# Patient Record
Sex: FEMALE | Race: WHITE | Employment: UNEMPLOYED | URBAN - METROPOLITAN AREA
[De-identification: names, ages, dates, MRNs, and addresses within clinical notes are randomized per-mention and may not be internally consistent; named-entity substitution may affect disease eponyms.]

---

## 2023-02-17 ENCOUNTER — ANESTHESIA EVENT (OUTPATIENT)
Dept: OPERATING ROOM | Age: 33
End: 2023-02-17
Payer: COMMERCIAL

## 2023-02-17 RX ORDER — CHOLECALCIFEROL (VITAMIN D3) 125 MCG
5000 CAPSULE ORAL DAILY
COMMUNITY

## 2023-02-17 RX ORDER — ASPIRIN 81 MG/1
81 TABLET ORAL DAILY
COMMUNITY

## 2023-02-17 RX ORDER — CYANOCOBALAMIN (VITAMIN B-12) 500 MCG
TABLET ORAL DAILY
COMMUNITY

## 2023-02-17 NOTE — PROGRESS NOTES
April Rainey   Date and time of surgery :0915  Arrival Time:0715   1. Do not eat or drink anything after 12 midnight (or  hours) prior to surgery. This includes no water, chewing gum or mints.   2. Take the following pills will a small sip of water on the morning of surgery none.   3. Aspirin, Ibuprofen, Advil, Naproxen, Vitamin E and other Anti-inflammatory products should be stopped for 5 days before surgery or as directed by your physician.   4. Check with your Doctor regarding stopping Plavix, Coumadin, Lovenox, Fragmin or other blood thinners   5. Do not smoke, and do not drink any alcoholic beverages 24 hours prior to surgery.  This includes NA Beer.   6. You may brush your teeth and gargle the morning of surgery.  DO NOT SWALLOW WATER.   7. You MUST make arrangements for a responsible adult to take you home after your surgery. You will not be allowed to leave alone or drive yourself home.  It is strongly suggested someone stay with you the first 24 hrs. Your surgery will be cancelled if you do not have a ride home.   8. A parent/legal guardian must accompany a child scheduled for surgery and plan to stay at the hospital until the child is discharged.  Please do not bring other children with you.   9. Please wear simple, loose fitting clothing to the hospital.  Do not bring valuables (money, credit cards, checkbooks, etc.) Do not wear any makeup (including no eye makeup) or nail polish on your fingers or toes.   10. DO NOT wear any jewelry or piercings on day of surgery.  All body piercing jewelry must be removed.   11. If you have dentures, they will be removed before going to the OR; we will provide you a container.  If you wear contact lenses or glasses, they will be removed; please bring a case for them.   12. Please see your family doctor/pediatrician for a history & physical and/or concerning medications.  Bring any test results/reports from your physician's office.   13. Remember to bring Blood Bank  bracelet to the hospital on the day of surgery. 14. If you have a Living Will and Durable Power of  for Healthcare, please bring in a copy. 13. Notify your Surgeon if you develop any illness between now and surgery  time, cough, cold, fever, sore throat, nausea, vomiting, etc.  Please notify your surgeon if you experience dizziness, shortness of breath or blurred vision between now & the time of your surgery   16. DO NOT shave your operative site 96 hours prior to surgery. For face & neck surgery, men may use an electric razor 48 hours prior to surgery. 17. Shower the night before surgery with Antibacterial soap Hibiclens   18. To provide excellent care visitors will be limited to one in the room at any given time. 19.  Please bring picture ID and insurance card. 20.  Visit our web site for additional information:  [email or web address]. *Please call pre admission testing if you any further questions   Den Alex         [address] [place] [id number]    [id number]  [facility]  [phone]   [address] [place] [address]

## 2023-02-22 ENCOUNTER — ANESTHESIA (OUTPATIENT)
Dept: OPERATING ROOM | Age: 33
End: 2023-02-22
Payer: COMMERCIAL

## 2023-02-22 ENCOUNTER — HOSPITAL ENCOUNTER (OUTPATIENT)
Age: 33
Setting detail: OUTPATIENT SURGERY
Discharge: HOME | End: 2023-02-22
Attending: OBSTETRICS & GYNECOLOGY | Admitting: OBSTETRICS & GYNECOLOGY
Payer: COMMERCIAL

## 2023-02-22 VITALS
RESPIRATION RATE: 16 BRPM | TEMPERATURE: 98.3 F | OXYGEN SATURATION: 98 % | WEIGHT: 135.25 LBS | HEIGHT: 69 IN | SYSTOLIC BLOOD PRESSURE: 126 MMHG | BODY MASS INDEX: 20.03 KG/M2 | DIASTOLIC BLOOD PRESSURE: 83 MMHG | HEART RATE: 79 BPM

## 2023-02-22 DIAGNOSIS — O03.4 RETAINED PRODUCTS OF CONCEPTION FOLLOWING ABORTION: ICD-10-CM

## 2023-02-22 LAB
ABO/RH: NORMAL
ANTIBODY SCREEN: NORMAL
HCT VFR BLD CALC: 39.5 % (ref 36–48)
HEMOGLOBIN: 13.2 G/DL (ref 12–16)
MCH RBC QN AUTO: 30.9 PG (ref 26–34)
MCHC RBC AUTO-ENTMCNC: 33.3 G/DL (ref 31–36)
MCV RBC AUTO: 92.9 FL (ref 80–100)
PDW BLD-RTO: 12.9 % (ref 12.4–15.4)
PLATELET # BLD: 239 K/UL (ref 135–450)
PMV BLD AUTO: 8.2 FL (ref 5–10.5)
PREGNANCY, URINE: NEGATIVE
RBC # BLD: 4.25 M/UL (ref 4–5.2)
WBC # BLD: 5.5 K/UL (ref 4–11)

## 2023-02-22 PROCEDURE — 86850 RBC ANTIBODY SCREEN: CPT

## 2023-02-22 PROCEDURE — 7100000000 HC PACU RECOVERY - FIRST 15 MIN: Performed by: OBSTETRICS & GYNECOLOGY

## 2023-02-22 PROCEDURE — 6360000002 HC RX W HCPCS

## 2023-02-22 PROCEDURE — 2500000003 HC RX 250 WO HCPCS

## 2023-02-22 PROCEDURE — 85027 COMPLETE CBC AUTOMATED: CPT

## 2023-02-22 PROCEDURE — 2580000003 HC RX 258: Performed by: OBSTETRICS & GYNECOLOGY

## 2023-02-22 PROCEDURE — 2580000003 HC RX 258: Performed by: ANESTHESIOLOGY

## 2023-02-22 PROCEDURE — 7100000010 HC PHASE II RECOVERY - FIRST 15 MIN: Performed by: OBSTETRICS & GYNECOLOGY

## 2023-02-22 PROCEDURE — 88305 TISSUE EXAM BY PATHOLOGIST: CPT

## 2023-02-22 PROCEDURE — 2709999900 HC NON-CHARGEABLE SUPPLY: Performed by: OBSTETRICS & GYNECOLOGY

## 2023-02-22 PROCEDURE — 3600000003 HC SURGERY LEVEL 3 BASE: Performed by: OBSTETRICS & GYNECOLOGY

## 2023-02-22 PROCEDURE — 6370000000 HC RX 637 (ALT 250 FOR IP): Performed by: OBSTETRICS & GYNECOLOGY

## 2023-02-22 PROCEDURE — 86901 BLOOD TYPING SEROLOGIC RH(D): CPT

## 2023-02-22 PROCEDURE — 7100000001 HC PACU RECOVERY - ADDTL 15 MIN: Performed by: OBSTETRICS & GYNECOLOGY

## 2023-02-22 PROCEDURE — 3700000000 HC ANESTHESIA ATTENDED CARE: Performed by: OBSTETRICS & GYNECOLOGY

## 2023-02-22 PROCEDURE — 3700000001 HC ADD 15 MINUTES (ANESTHESIA): Performed by: OBSTETRICS & GYNECOLOGY

## 2023-02-22 PROCEDURE — 2720000010 HC SURG SUPPLY STERILE: Performed by: OBSTETRICS & GYNECOLOGY

## 2023-02-22 PROCEDURE — 3600000013 HC SURGERY LEVEL 3 ADDTL 15MIN: Performed by: OBSTETRICS & GYNECOLOGY

## 2023-02-22 PROCEDURE — 2500000003 HC RX 250 WO HCPCS: Performed by: ANESTHESIOLOGY

## 2023-02-22 PROCEDURE — 7100000011 HC PHASE II RECOVERY - ADDTL 15 MIN: Performed by: OBSTETRICS & GYNECOLOGY

## 2023-02-22 PROCEDURE — 84703 CHORIONIC GONADOTROPIN ASSAY: CPT

## 2023-02-22 PROCEDURE — 86900 BLOOD TYPING SEROLOGIC ABO: CPT

## 2023-02-22 PROCEDURE — 6370000000 HC RX 637 (ALT 250 FOR IP): Performed by: ANESTHESIOLOGY

## 2023-02-22 RX ORDER — KETOROLAC TROMETHAMINE 30 MG/ML
INJECTION, SOLUTION INTRAMUSCULAR; INTRAVENOUS PRN
Status: DISCONTINUED | OUTPATIENT
Start: 2023-02-22 | End: 2023-02-22 | Stop reason: SDUPTHER

## 2023-02-22 RX ORDER — SODIUM CHLORIDE 0.9 % (FLUSH) 0.9 %
5-40 SYRINGE (ML) INJECTION EVERY 12 HOURS SCHEDULED
Status: DISCONTINUED | OUTPATIENT
Start: 2023-02-22 | End: 2023-02-22 | Stop reason: HOSPADM

## 2023-02-22 RX ORDER — SODIUM CHLORIDE 0.9 % (FLUSH) 0.9 %
5-40 SYRINGE (ML) INJECTION PRN
Status: DISCONTINUED | OUTPATIENT
Start: 2023-02-22 | End: 2023-02-22 | Stop reason: HOSPADM

## 2023-02-22 RX ORDER — SODIUM CHLORIDE 9 MG/ML
INJECTION, SOLUTION INTRAVENOUS PRN
Status: DISCONTINUED | OUTPATIENT
Start: 2023-02-22 | End: 2023-02-22 | Stop reason: HOSPADM

## 2023-02-22 RX ORDER — SODIUM CHLORIDE, SODIUM LACTATE, POTASSIUM CHLORIDE, AND CALCIUM CHLORIDE .6; .31; .03; .02 G/100ML; G/100ML; G/100ML; G/100ML
IRRIGANT IRRIGATION PRN
Status: DISCONTINUED | OUTPATIENT
Start: 2023-02-22 | End: 2023-02-22 | Stop reason: ALTCHOICE

## 2023-02-22 RX ORDER — DEXAMETHASONE SODIUM PHOSPHATE 4 MG/ML
4 INJECTION, SOLUTION INTRA-ARTICULAR; INTRALESIONAL; INTRAMUSCULAR; INTRAVENOUS; SOFT TISSUE
Status: DISCONTINUED | OUTPATIENT
Start: 2023-02-22 | End: 2023-02-22 | Stop reason: HOSPADM

## 2023-02-22 RX ORDER — OXYCODONE HYDROCHLORIDE 5 MG/1
10 TABLET ORAL PRN
Status: COMPLETED | OUTPATIENT
Start: 2023-02-22 | End: 2023-02-22

## 2023-02-22 RX ORDER — MEPERIDINE HYDROCHLORIDE 50 MG/ML
INJECTION INTRAMUSCULAR; INTRAVENOUS; SUBCUTANEOUS
Status: COMPLETED
Start: 2023-02-22 | End: 2023-02-22

## 2023-02-22 RX ORDER — SODIUM CHLORIDE, SODIUM LACTATE, POTASSIUM CHLORIDE, CALCIUM CHLORIDE 600; 310; 30; 20 MG/100ML; MG/100ML; MG/100ML; MG/100ML
INJECTION, SOLUTION INTRAVENOUS CONTINUOUS
Status: DISCONTINUED | OUTPATIENT
Start: 2023-02-22 | End: 2023-02-22 | Stop reason: HOSPADM

## 2023-02-22 RX ORDER — PROPOFOL 10 MG/ML
INJECTION, EMULSION INTRAVENOUS PRN
Status: DISCONTINUED | OUTPATIENT
Start: 2023-02-22 | End: 2023-02-22 | Stop reason: SDUPTHER

## 2023-02-22 RX ORDER — ONDANSETRON 2 MG/ML
4 INJECTION INTRAMUSCULAR; INTRAVENOUS
Status: DISCONTINUED | OUTPATIENT
Start: 2023-02-22 | End: 2023-02-22 | Stop reason: HOSPADM

## 2023-02-22 RX ORDER — FAMOTIDINE 10 MG/ML
20 INJECTION, SOLUTION INTRAVENOUS ONCE
Status: COMPLETED | OUTPATIENT
Start: 2023-02-22 | End: 2023-02-22

## 2023-02-22 RX ORDER — HYDRALAZINE HYDROCHLORIDE 20 MG/ML
10 INJECTION INTRAMUSCULAR; INTRAVENOUS
Status: DISCONTINUED | OUTPATIENT
Start: 2023-02-22 | End: 2023-02-22 | Stop reason: HOSPADM

## 2023-02-22 RX ORDER — DIPHENHYDRAMINE HYDROCHLORIDE 50 MG/ML
12.5 INJECTION INTRAMUSCULAR; INTRAVENOUS
Status: DISCONTINUED | OUTPATIENT
Start: 2023-02-22 | End: 2023-02-22 | Stop reason: HOSPADM

## 2023-02-22 RX ORDER — IPRATROPIUM BROMIDE AND ALBUTEROL SULFATE 2.5; .5 MG/3ML; MG/3ML
1 SOLUTION RESPIRATORY (INHALATION)
Status: DISCONTINUED | OUTPATIENT
Start: 2023-02-22 | End: 2023-02-22 | Stop reason: HOSPADM

## 2023-02-22 RX ORDER — SCOLOPAMINE TRANSDERMAL SYSTEM 1 MG/1
1 PATCH, EXTENDED RELEASE TRANSDERMAL
Status: DISCONTINUED | OUTPATIENT
Start: 2023-02-22 | End: 2023-02-22 | Stop reason: HOSPADM

## 2023-02-22 RX ORDER — EPHEDRINE SULFATE 50 MG/ML
INJECTION INTRAVENOUS PRN
Status: DISCONTINUED | OUTPATIENT
Start: 2023-02-22 | End: 2023-02-22 | Stop reason: SDUPTHER

## 2023-02-22 RX ORDER — MIDAZOLAM HYDROCHLORIDE 1 MG/ML
2 INJECTION INTRAMUSCULAR; INTRAVENOUS
Status: DISCONTINUED | OUTPATIENT
Start: 2023-02-22 | End: 2023-02-22 | Stop reason: HOSPADM

## 2023-02-22 RX ORDER — ONDANSETRON 2 MG/ML
INJECTION INTRAMUSCULAR; INTRAVENOUS PRN
Status: DISCONTINUED | OUTPATIENT
Start: 2023-02-22 | End: 2023-02-22 | Stop reason: SDUPTHER

## 2023-02-22 RX ORDER — LIDOCAINE HYDROCHLORIDE 20 MG/ML
INJECTION, SOLUTION EPIDURAL; INFILTRATION; INTRACAUDAL; PERINEURAL PRN
Status: DISCONTINUED | OUTPATIENT
Start: 2023-02-22 | End: 2023-02-22 | Stop reason: SDUPTHER

## 2023-02-22 RX ORDER — FENTANYL CITRATE 50 UG/ML
INJECTION, SOLUTION INTRAMUSCULAR; INTRAVENOUS PRN
Status: DISCONTINUED | OUTPATIENT
Start: 2023-02-22 | End: 2023-02-22 | Stop reason: SDUPTHER

## 2023-02-22 RX ORDER — OXYCODONE HYDROCHLORIDE 5 MG/1
5 TABLET ORAL PRN
Status: COMPLETED | OUTPATIENT
Start: 2023-02-22 | End: 2023-02-22

## 2023-02-22 RX ORDER — DOXYCYCLINE HYCLATE 100 MG
200 TABLET ORAL ONCE
Status: COMPLETED | OUTPATIENT
Start: 2023-02-22 | End: 2023-02-22

## 2023-02-22 RX ORDER — DEXAMETHASONE SODIUM PHOSPHATE 4 MG/ML
INJECTION, SOLUTION INTRA-ARTICULAR; INTRALESIONAL; INTRAMUSCULAR; INTRAVENOUS; SOFT TISSUE PRN
Status: DISCONTINUED | OUTPATIENT
Start: 2023-02-22 | End: 2023-02-22 | Stop reason: SDUPTHER

## 2023-02-22 RX ORDER — MEPERIDINE HYDROCHLORIDE 50 MG/ML
12.5 INJECTION INTRAMUSCULAR; INTRAVENOUS; SUBCUTANEOUS EVERY 5 MIN PRN
Status: DISCONTINUED | OUTPATIENT
Start: 2023-02-22 | End: 2023-02-22 | Stop reason: HOSPADM

## 2023-02-22 RX ORDER — ACETAMINOPHEN 325 MG/1
650 TABLET ORAL
Status: DISCONTINUED | OUTPATIENT
Start: 2023-02-22 | End: 2023-02-22 | Stop reason: HOSPADM

## 2023-02-22 RX ORDER — MIDAZOLAM HYDROCHLORIDE 1 MG/ML
INJECTION INTRAMUSCULAR; INTRAVENOUS PRN
Status: DISCONTINUED | OUTPATIENT
Start: 2023-02-22 | End: 2023-02-22 | Stop reason: SDUPTHER

## 2023-02-22 RX ADMIN — PROPOFOL 200 MG: 10 INJECTION, EMULSION INTRAVENOUS at 09:13

## 2023-02-22 RX ADMIN — LIDOCAINE HYDROCHLORIDE 100 MG: 20 INJECTION, SOLUTION EPIDURAL; INFILTRATION; INTRACAUDAL; PERINEURAL at 09:13

## 2023-02-22 RX ADMIN — SODIUM CHLORIDE, SODIUM LACTATE, POTASSIUM CHLORIDE, AND CALCIUM CHLORIDE: .6; .31; .03; .02 INJECTION, SOLUTION INTRAVENOUS at 09:09

## 2023-02-22 RX ADMIN — EPHEDRINE SULFATE 10 MG: 50 INJECTION INTRAVENOUS at 09:39

## 2023-02-22 RX ADMIN — FENTANYL CITRATE 100 MCG: 50 INJECTION INTRAMUSCULAR; INTRAVENOUS at 09:12

## 2023-02-22 RX ADMIN — EPHEDRINE SULFATE 10 MG: 50 INJECTION INTRAVENOUS at 09:50

## 2023-02-22 RX ADMIN — KETOROLAC TROMETHAMINE 30 MG: 30 INJECTION, SOLUTION INTRAMUSCULAR; INTRAVENOUS at 09:52

## 2023-02-22 RX ADMIN — EPHEDRINE SULFATE 10 MG: 50 INJECTION INTRAVENOUS at 09:31

## 2023-02-22 RX ADMIN — ONDANSETRON 4 MG: 2 INJECTION INTRAMUSCULAR; INTRAVENOUS at 09:11

## 2023-02-22 RX ADMIN — MEPERIDINE HYDROCHLORIDE 12.5 MG: 50 INJECTION, SOLUTION INTRAMUSCULAR; INTRAVENOUS; SUBCUTANEOUS at 10:27

## 2023-02-22 RX ADMIN — MIDAZOLAM HYDROCHLORIDE 2 MG: 2 INJECTION, SOLUTION INTRAMUSCULAR; INTRAVENOUS at 09:08

## 2023-02-22 RX ADMIN — OXYCODONE 5 MG: 5 TABLET ORAL at 11:34

## 2023-02-22 RX ADMIN — PROPOFOL 50 MG: 10 INJECTION, EMULSION INTRAVENOUS at 09:14

## 2023-02-22 RX ADMIN — DEXAMETHASONE SODIUM PHOSPHATE 10 MG: 4 INJECTION, SOLUTION INTRAMUSCULAR; INTRAVENOUS at 09:11

## 2023-02-22 RX ADMIN — MEPERIDINE HYDROCHLORIDE 12.5 MG: 50 INJECTION INTRAMUSCULAR; INTRAVENOUS; SUBCUTANEOUS at 10:27

## 2023-02-22 RX ADMIN — DOXYCYCLINE HYCLATE 200 MG: 100 TABLET, COATED ORAL at 07:48

## 2023-02-22 RX ADMIN — FAMOTIDINE 20 MG: 10 INJECTION, SOLUTION INTRAVENOUS at 07:48

## 2023-02-22 ASSESSMENT — PAIN DESCRIPTION - DESCRIPTORS
DESCRIPTORS: CRAMPING
DESCRIPTORS: CRAMPING

## 2023-02-22 ASSESSMENT — PAIN SCALES - GENERAL
PAINLEVEL_OUTOF10: 0
PAINLEVEL_OUTOF10: 4
PAINLEVEL_OUTOF10: 2

## 2023-02-22 ASSESSMENT — PAIN DESCRIPTION - LOCATION
LOCATION: ABDOMEN
LOCATION: ABDOMEN;BACK

## 2023-02-22 ASSESSMENT — PAIN - FUNCTIONAL ASSESSMENT: PAIN_FUNCTIONAL_ASSESSMENT: ACTIVITIES ARE NOT PREVENTED

## 2023-02-22 ASSESSMENT — PAIN DESCRIPTION - ORIENTATION: ORIENTATION: LOWER

## 2023-02-22 NOTE — BRIEF OP NOTE
Brief Postoperative Note      Patient: Debora Morejon  YOB: 1990  MRN: [id number]    Date of Procedure: 2/22/2023    Pre-Op Diagnosis: RETAINED PRODUCTS OF CONCEPTION AFTER MISSED AB    Post-Op Diagnosis: Same       Procedure(s):  DILATATION AND CURETTAGE, VIDEO  HYSTEROSCOPY MYOSURE REMOVAL OF RETAINED PRODUCTS OF CONCEPTION/ENDOMETRIAL MASS    Surgeon(s):  Ursula Dietrich MD    Assistant:  Surgical Assistant: Annita Singleton    Anesthesia: General    Estimated Blood Loss (mL): Minimal    Complications: None    Specimens:   ID Type Source Tests Collected by Time Destination   A : ENDOMETRIAL CURRETTINGS Tissue Endometrium SURGICAL PATHOLOGY Ursula Dietrich MD 2/22/2023 2319        Implants:  * No implants in log *      Drains: * No LDAs found *    Findings: uterus retroverted, nonenlarged. On hysteroscopy , endometrial mass noted on left side of cavity. Removed partially with myosure. Hit fluid deficit of 1200cc but significant amount of fluid noted on floor. D&C performed and specimen sent. Looked with hysteroscopy again and mass noted to be removed.      Dictation#: [id number]    Electronically signed by Ursula Dietrich MD on 2/22/2023 at 10:05 AM

## 2023-02-22 NOTE — DISCHARGE INSTRUCTIONS
DILATATION & CURETTAGE AND/OR HYSTEROSCOPY AND/OR ABLATION      Discharge instructions for day surgery patients and family    1. Since you may experience some intermittent light-headedness for the next several hours, we suggest you plan on bed rest or quiet relaxation this evening. You must have a friend or relative stay with you tonight. 2.  Because of the sedation you have received it is recommended that you do not drive    motor vehicle, operate any kind of machinery, or sign any contractual agreement for 24 hours following  The procedure. 3.  You should not take any alcoholic beverages tonight and only take sleeping medication that has been specifically prescribed for you by your physician. 4.  Eat light for your first meal and then gradually progress yourself back to a regular diet. 5.  If you experience pain in your surgical area take Tylenol, or the pain medication prescribed by your doctor. Some pain medications are very irritating when taken on an empty stomach, so try to take the medication wit a light meal or a glass of milk  6. If you have any fever, chills, bleeding or uncontrollable pain or any other problems, notify your surgeon. OTHER INSTRUCTIONS:    PAIN:     Tylenol or ASA q3-4 hours PRN, call if no relief  ________________________________________________________________________  ACTIVITY:    Take it easy 1-2 days  EXERCISE:    After 1 week.  ________________________________________________________________________  SEX, DOUCHING, TAMPONS None for 1 week  ________________________________________________________________________  TUB BATH, SWIMMING:  None for 1 week  APPOINTMENT:   Call for appointment 3-4 weeks. ________________________________________________________________________  DRESSING:    Clean & dry for 48 hours, then removed dressing       And leave to open air.

## 2023-02-22 NOTE — PROGRESS NOTES
Patient admitted to [facility] 1 in preparation for surgery, VSS. Consent confirmed. IV inserted into left wrist, LR infusing. Belongings on cart to PACU. NPO since 2100.

## 2023-02-22 NOTE — PROGRESS NOTES
Pain improved. 2/10. Voided x 2. No nausea. IV removed. Discharge instructions given and verbalized understanding. Dressed. Discharged to car by wheelchair.

## 2023-02-22 NOTE — H&P
I have reviewed the history and physical and examined the patient and find no relevant changes. I have reviewed with the patient and/or family the risks, benefits, and alternatives to the procedure.     Date of original H&P:2/14/23    Arabella Steward MD

## 2023-02-22 NOTE — PROGRESS NOTES
To Phase 2. Assisted to bathroom -voided. Medicated for cramping. Using warm packs on abdomen and lower back-states help. Scant vaginal drainage.  to bedside. Taking po liquids and jello.

## 2023-02-22 NOTE — ANESTHESIA PRE PROCEDURE
Department of Anesthesiology  Preprocedure Note       Name:  Brooke Day   Age:  28 y.o.  :  1990                                          MRN:  [id number]         Date:  2023      Surgeon: Blayne Cummings):  Eloise Newton MD    Procedure: Procedure(s):  DILATATION AND CURETTAGE, VIDEO  HYSTEROSCOPY REMOVAL OF RETAINED PRODUCTS OF CONCEPTION    Medications prior to admission:   Prior to Admission medications    Medication Sig Start Date End Date Taking? Authorizing Provider   aspirin 81 MG EC tablet Take 81 mg by mouth daily   Yes Historical Provider, MD   Cyanocobalamin (VITAMIN B 12) 500 MCG TABS Take by mouth daily   Yes Historical Provider, MD   Pyridoxine HCl (VITAMIN B-6 PO) Take 500 mg by mouth   Yes Historical Provider, MD   vitamin D (D-3-5) 125 MCG (5000 UT) CAPS capsule Take 5,000 Units by mouth daily   Yes Historical Provider, MD   Prenatal Vit-Fe Fumarate-FA (PRENATAL VITAMINS PO) Take by mouth daily    Historical Provider, MD   Multiple Vitamin (MULTIVITAMIN PO) Take by mouth daily    Historical Provider, MD       Current medications:    Current Facility-Administered Medications   Medication Dose Route Frequency Provider Last Rate Last Admin    famotidine (PEPCID) injection 20 mg  20 mg IntraVENous Once Leandra Cabrera MD        lactated ringers IV soln infusion   IntraVENous Continuous Leandra Cabrera MD        sodium chloride flush 0.9 % injection 5-40 mL  5-40 mL IntraVENous 2 times per day Leandra Cabrera MD        sodium chloride flush 0.9 % injection 5-40 mL  5-40 mL IntraVENous PRN Leandra Cabrera MD        0.9 % sodium chloride infusion   IntraVENous PRN Leandra Cabrera MD        doxycycline hyclate (VIBRA-TABS) tablet 200 mg  200 mg Oral Once Eloise Newton MD           Allergies: Allergies   Allergen Reactions    Gluten        Problem List:  There is no problem list on file for this patient.       Past Medical History: Diagnosis Date    PONV (postoperative nausea and vomiting)        Past Surgical History:        Procedure Laterality Date    ANKLE SURGERY Left     cartilage repair    WISDOM TOOTH EXTRACTION         Social History:    Social History     Tobacco Use    Smoking status: Never    Smokeless tobacco: Never   Substance Use Topics    Alcohol use: Not Currently                                Counseling given: Not Answered      Vital Signs (Current):   Vitals:    02/17/23 1206   Weight: 130 lb (59 kg)   Height: 5' 9\" (1.753 m)                                              BP Readings from Last 3 Encounters:   No data found for BP       NPO Status:                                                                                 BMI:   Wt Readings from Last 3 Encounters:   02/17/23 130 lb (59 kg)     Body mass index is 19.2 kg/m². CBC: No results found for: WBC, RBC, HGB, HCT, MCV, RDW, PLT    CMP: No results found for: NA, K, CL, CO2, BUN, CREATININE, GFRAA, AGRATIO, LABGLOM, GLUCOSE, GLU, PROT, CALCIUM, BILITOT, ALKPHOS, AST, ALT    POC Tests: No results for input(s): POCGLU, POCNA, POCK, POCCL, POCBUN, POCHEMO, POCHCT in the last 72 hours. Coags: No results found for: PROTIME, INR, APTT    HCG (If Applicable):   Lab Results   Component Value Date    PREGTESTUR Negative 02/22/2023        ABGs: No results found for: PHART, PO2ART, HTX6DYU, NSP0EDJ, BEART, F0BSPINF     Type & Screen (If Applicable):  No results found for: LABABO, LABRH    Drug/Infectious Status (If Applicable):  No results found for: HIV, HEPCAB    COVID-19 Screening (If Applicable): No results found for: COVID19        Anesthesia Evaluation  Patient summary reviewed and Nursing notes reviewed   history of anesthetic complications: PONV.   Airway: Mallampati: II  TM distance: >3 FB   Neck ROM: full  Mouth opening: > = 3 FB   Dental: normal exam         Pulmonary:Negative Pulmonary ROS and normal exam Cardiovascular:Negative CV ROS                      Neuro/Psych:   Negative Neuro/Psych ROS              GI/Hepatic/Renal: Neg GI/Hepatic/Renal ROS            Endo/Other: Negative Endo/Other ROS                    Abdominal:             Vascular: negative vascular ROS. Other Findings:           Anesthesia Plan      general     ASA 2       Induction: intravenous. MIPS: Postoperative opioids intended. Anesthetic plan and risks discussed with patient. Plan discussed with CRNA.     Attending anesthesiologist reviewed and agrees with Preprocedure content                BROWN Ramos MD   2/22/2023

## 2023-02-22 NOTE — PROGRESS NOTES
Patient arrived to PACU bay 1 placed on bedside monitor. VSS. Report obtained from OR RN and anesthesia.

## 2023-02-22 NOTE — ANESTHESIA POSTPROCEDURE EVALUATION
Department of Anesthesiology  Postprocedure Note    Patient: Elaine Fernandes  MRN: [id number]  YOB: 1990  Date of evaluation: 2023      Procedure Summary     Date: 23 Room / Location: [address] [place] [address]    Anesthesia Start: 1100 Anesthesia Stop: 1011    Procedure: DILATATION AND CURETTAGE, VIDEO  HYSTEROSCOPY MYOSURE REMOVAL OF RETAINED PRODUCTS OF CONCEPTION/ENDOMETRIAL MASS (Vagina ) Diagnosis:       Retained products of conception following       (RETAINED PRODUCTS OF CONCEPTION AFTER MISSED AB)    Surgeons: Valery Hammer MD Responsible Provider: Delaney Miller MD    Anesthesia Type: general ASA Status: 2          Anesthesia Type: No value filed.     Amol Phase I: Amol Score: 9    Amol Phase II:        Anesthesia Post Evaluation    Patient location during evaluation: PACU  Patient participation: complete - patient participated  Level of consciousness: awake  Pain score: 0  Airway patency: patent  Nausea & Vomiting: no nausea  Complications: no  Cardiovascular status: blood pressure returned to baseline  Respiratory status: acceptable  Hydration status: euvolemic

## 2023-02-23 NOTE — OP NOTE
[facility] [place] [address]                                OPERATIVE REPORT    PATIENT NAME: Radha Monroy                       :        1990  MED REC NO:   [id number]                          ROOM:  ACCOUNT NO:   [de-identified]                           ADMIT DATE: 2023  PROVIDER:     Latasha Monroy MD    DATE OF PROCEDURE:  2023    PREOPERATIVE DIAGNOSIS:  Retained products of conception after missed  AB. POSTOPERATIVE DIAGNOSIS:  Retained products of conception after missed  AB. OPERATION PERFORMED:  Hysteroscopy, D and C, MyoSure removal of  endometrial mass/retained products of conception. SURGEON:  Latasha Monroy MD    ANESTHESIA:  General.    ESTIMATED BLOOD LOSS:  Minimal.    SPECIMEN:  Endometrial curettings and endometrial mass to pathology. FINDINGS:  Uterus retroverted, nonenlarged on hysteroscopy. Endometrial  mass noted on the left side of cavity, removed partially with MyoSure. We did have a fluid deficit of 1200, but significant amount of fluid  noted on the floor. The D and C was then performed and specimen are  together. Once again with hysteroscopy again, the mass noted to be  removed. INDICATIONS AND CONSENT:  The patient is a 70-year-old G1, P0, who did  undergo a IVF cycle with a pregnancy with Dr. Colin Carlos, her fertility  doctor. She was noted to ultrasound on multiple episodes to have a  missed AB. The patient did see us at the end of December and was  scheduled for suction D and C, but the suction D and C was canceled as  the patient had significant bleeding and suspected passage of tissue. We did begin to follow her HCGs down with plan to follow up to 0. She  instead followed up with Dr. Colin Carlos .   She did start a new IVF  cycle, but canceled on the day of transfer secondary to a mass possible  blood clot or products of conception that were retained, that were noted  on ultrasound and she was sent back to our office for D and C. Reviewed  ultrasound findings with the patient and discussed hysteroscopy, D and  C, MyoSure removal of endometrial mass or products of conception. The  patient desired to proceed. Reviewed risks including bleeding,  infection, risk of injury to surrounding organs such as bladder and  bowel and possible perforation. The patient stated understanding and  desired to proceed. OPERATIVE PROCEDURE:  The patient was taken to the operating room where  general anesthesia was induced and found to be adequate. The patient  was prepped and draped in the dorsal supine position with legs in candy  cane stirrups. Time-out was performed. Next the procedure was started. Of note, the patient did get 200 mg doxycycline prior to procedure. A  weighted speculum was placed in the vagina and a Mendoza retractor was used  to isolate the cervix. Anterior loop of the cervix was grasped using  the single tooth tenaculum. The cervix was then dilated and the  hysteroscope was entered into the uterine cavity. An endometrial mass  was noted on the left side of the uterine cavity blocking the left  ostia. We did use the MyoSure to remove most of this and we did have  fluid deficit at this point, fluid deficit around 1200, but significant  amount of fluid noted on the floor. At this time, a hysteroscope was  removed and a D and C was performed. The specimen was sent together  with the other specimen. We did looked in with a hysteroscope and then  did use MyoSure one more time to remove a small amount of tissue that  was noted for the mass to be removed. All instruments were removed from  the vagina. All counts were correct x2. Specimen sent to pathology. The patient was taken to recovery room in stable condition.         Sukhwinder Christine MD    D: 02/22/2023 10:13:47       T: 02/22/2023 17:52:02     [email or web address]  Job#: [id number]     Doc#: [id number]    CC:

## 2023-10-25 LAB
ABO, EXTERNAL RESULT: NORMAL
HEP B, EXTERNAL RESULT: NEGATIVE
HIV, EXTERNAL RESULT: NON REACTIVE
RH FACTOR, EXTERNAL RESULT: NEGATIVE
RUBELLA TITER, EXTERNAL RESULT: NORMAL

## 2024-01-25 LAB — T. PALLIDUM (SYPHILIS) ANTIBODY, EXTERNAL RESULT: NONREACTIVE

## 2024-04-13 LAB
C. TRACHOMATIS, EXTERNAL RESULT: NEGATIVE
GBS, EXTERNAL RESULT: NEGATIVE
N. GONORRHOEAE, EXTERNAL RESULT: NEGATIVE

## 2024-05-05 ENCOUNTER — ANESTHESIA (OUTPATIENT)
Dept: LABOR AND DELIVERY | Age: 34
End: 2024-05-05
Payer: COMMERCIAL

## 2024-05-05 ENCOUNTER — HOSPITAL ENCOUNTER (INPATIENT)
Age: 34
LOS: 3 days | Discharge: HOME | End: 2024-05-08
Attending: OBSTETRICS & GYNECOLOGY | Admitting: OBSTETRICS & GYNECOLOGY
Payer: COMMERCIAL

## 2024-05-05 ENCOUNTER — ANESTHESIA EVENT (OUTPATIENT)
Dept: LABOR AND DELIVERY | Age: 34
End: 2024-05-05
Payer: COMMERCIAL

## 2024-05-05 PROBLEM — Z34.90 TERM PREGNANCY: Status: ACTIVE | Noted: 2024-05-05

## 2024-05-05 LAB
ABO + RH BLD: NORMAL
AMPHETAMINES UR QL SCN>1000 NG/ML: NORMAL
BARBITURATES UR QL SCN>200 NG/ML: NORMAL
BENZODIAZ UR QL SCN>200 NG/ML: NORMAL
BLD GP AB SCN SERPL QL: NORMAL
BUPRENORPHINE+NOR UR QL SCN: NORMAL
CANNABINOIDS UR QL SCN>50 NG/ML: NORMAL
COCAINE UR QL SCN: NORMAL
DEPRECATED RDW RBC AUTO: 13.2 % (ref 12.4–15.4)
DRUG SCREEN COMMENT UR-IMP: NORMAL
FENTANYL SCREEN, URINE: NORMAL
HCT VFR BLD AUTO: 36.4 % (ref 36–48)
HGB BLD-MCNC: 12.2 G/DL (ref 12–16)
MCH RBC QN AUTO: 30.4 PG (ref 26–34)
MCHC RBC AUTO-ENTMCNC: 33.4 G/DL (ref 31–36)
MCV RBC AUTO: 91.1 FL (ref 80–100)
METHADONE UR QL SCN>300 NG/ML: NORMAL
OPIATES UR QL SCN>300 NG/ML: NORMAL
OXYCODONE UR QL SCN: NORMAL
PCP UR QL SCN>25 NG/ML: NORMAL
PH UR STRIP: 6 [PH]
PLATELET # BLD AUTO: 238 K/UL (ref 135–450)
PMV BLD AUTO: 9.4 FL (ref 5–10.5)
RBC # BLD AUTO: 3.99 M/UL (ref 4–5.2)
WBC # BLD AUTO: 12.1 K/UL (ref 4–11)

## 2024-05-05 PROCEDURE — 0TQB0ZZ REPAIR BLADDER, OPEN APPROACH: ICD-10-PCS | Performed by: UROLOGY

## 2024-05-05 PROCEDURE — 7100000000 HC PACU RECOVERY - FIRST 15 MIN: Performed by: OBSTETRICS & GYNECOLOGY

## 2024-05-05 PROCEDURE — 2580000003 HC RX 258: Performed by: OBSTETRICS & GYNECOLOGY

## 2024-05-05 PROCEDURE — 7100000001 HC PACU RECOVERY - ADDTL 15 MIN: Performed by: OBSTETRICS & GYNECOLOGY

## 2024-05-05 PROCEDURE — 2500000003 HC RX 250 WO HCPCS: Performed by: REGISTERED NURSE

## 2024-05-05 PROCEDURE — 86780 TREPONEMA PALLIDUM: CPT

## 2024-05-05 PROCEDURE — 6360000002 HC RX W HCPCS: Performed by: OBSTETRICS & GYNECOLOGY

## 2024-05-05 PROCEDURE — 85027 COMPLETE CBC AUTOMATED: CPT

## 2024-05-05 PROCEDURE — 86900 BLOOD TYPING SEROLOGIC ABO: CPT

## 2024-05-05 PROCEDURE — 6360000002 HC RX W HCPCS

## 2024-05-05 PROCEDURE — 51701 INSERT BLADDER CATHETER: CPT

## 2024-05-05 PROCEDURE — 0T780DZ DILATION OF BILATERAL URETERS WITH INTRALUMINAL DEVICE, OPEN APPROACH: ICD-10-PCS | Performed by: UROLOGY

## 2024-05-05 PROCEDURE — 6360000002 HC RX W HCPCS: Performed by: REGISTERED NURSE

## 2024-05-05 PROCEDURE — C2617 STENT, NON-COR, TEM W/O DEL: HCPCS | Performed by: OBSTETRICS & GYNECOLOGY

## 2024-05-05 PROCEDURE — 2500000003 HC RX 250 WO HCPCS: Performed by: NURSE ANESTHETIST, CERTIFIED REGISTERED

## 2024-05-05 PROCEDURE — 1220000000 HC SEMI PRIVATE OB R&B

## 2024-05-05 PROCEDURE — 6360000002 HC RX W HCPCS: Performed by: NURSE ANESTHETIST, CERTIFIED REGISTERED

## 2024-05-05 PROCEDURE — 2580000003 HC RX 258: Performed by: NURSE ANESTHETIST, CERTIFIED REGISTERED

## 2024-05-05 PROCEDURE — 6370000000 HC RX 637 (ALT 250 FOR IP): Performed by: OBSTETRICS & GYNECOLOGY

## 2024-05-05 PROCEDURE — 2709999900 HC NON-CHARGEABLE SUPPLY: Performed by: OBSTETRICS & GYNECOLOGY

## 2024-05-05 PROCEDURE — 86901 BLOOD TYPING SEROLOGIC RH(D): CPT

## 2024-05-05 PROCEDURE — 80307 DRUG TEST PRSMV CHEM ANLYZR: CPT

## 2024-05-05 PROCEDURE — 86850 RBC ANTIBODY SCREEN: CPT

## 2024-05-05 PROCEDURE — C1769 GUIDE WIRE: HCPCS | Performed by: OBSTETRICS & GYNECOLOGY

## 2024-05-05 PROCEDURE — 3700000025 EPIDURAL BLOCK: Performed by: ANESTHESIOLOGY

## 2024-05-05 PROCEDURE — 2720000010 HC SURG SUPPLY STERILE: Performed by: OBSTETRICS & GYNECOLOGY

## 2024-05-05 PROCEDURE — 3609079900 HC CESAREAN SECTION: Performed by: OBSTETRICS & GYNECOLOGY

## 2024-05-05 DEVICE — URETERAL STENT
Type: IMPLANTABLE DEVICE | Site: URETER | Status: FUNCTIONAL
Brand: CONTOUR™

## 2024-05-05 RX ORDER — SEVOFLURANE 250 ML/250ML
1 LIQUID RESPIRATORY (INHALATION) CONTINUOUS PRN
Status: DISCONTINUED | OUTPATIENT
Start: 2024-05-05 | End: 2024-05-05

## 2024-05-05 RX ORDER — CEFAZOLIN SODIUM IN 0.9 % NACL 2 G/100 ML
2000 PLASTIC BAG, INJECTION (ML) INTRAVENOUS EVERY 8 HOURS
Status: DISCONTINUED | OUTPATIENT
Start: 2024-05-05 | End: 2024-05-05

## 2024-05-05 RX ORDER — SODIUM CHLORIDE 0.9 % (FLUSH) 0.9 %
5-40 SYRINGE (ML) INJECTION EVERY 12 HOURS SCHEDULED
Status: DISCONTINUED | OUTPATIENT
Start: 2024-05-05 | End: 2024-05-08 | Stop reason: HOSPADM

## 2024-05-05 RX ORDER — BUPIVACAINE HYDROCHLORIDE 5 MG/ML
INJECTION, SOLUTION EPIDURAL; INTRACAUDAL PRN
Status: DISCONTINUED | OUTPATIENT
Start: 2024-05-05 | End: 2024-05-21 | Stop reason: SDUPTHER

## 2024-05-05 RX ORDER — OXYCODONE HYDROCHLORIDE 5 MG/1
10 TABLET ORAL EVERY 4 HOURS PRN
Status: DISCONTINUED | OUTPATIENT
Start: 2024-05-05 | End: 2024-05-08 | Stop reason: HOSPADM

## 2024-05-05 RX ORDER — MIDAZOLAM HYDROCHLORIDE 1 MG/ML
INJECTION INTRAMUSCULAR; INTRAVENOUS PRN
Status: DISCONTINUED | OUTPATIENT
Start: 2024-05-05 | End: 2024-05-21 | Stop reason: SDUPTHER

## 2024-05-05 RX ORDER — DOCUSATE SODIUM 100 MG/1
100 CAPSULE, LIQUID FILLED ORAL 2 TIMES DAILY
Status: DISCONTINUED | OUTPATIENT
Start: 2024-05-05 | End: 2024-05-08 | Stop reason: HOSPADM

## 2024-05-05 RX ORDER — LIDOCAINE HYDROCHLORIDE 20 MG/ML
INJECTION, SOLUTION EPIDURAL; INFILTRATION; INTRACAUDAL; PERINEURAL PRN
Status: DISCONTINUED | OUTPATIENT
Start: 2024-05-05 | End: 2024-05-05

## 2024-05-05 RX ORDER — SODIUM CHLORIDE 0.9 % (FLUSH) 0.9 %
5-40 SYRINGE (ML) INJECTION PRN
Status: DISCONTINUED | OUTPATIENT
Start: 2024-05-05 | End: 2024-05-05

## 2024-05-05 RX ORDER — KETOROLAC TROMETHAMINE 30 MG/ML
INJECTION, SOLUTION INTRAMUSCULAR; INTRAVENOUS
Status: COMPLETED
Start: 2024-05-05 | End: 2024-05-05

## 2024-05-05 RX ORDER — CARBOPROST TROMETHAMINE 250 UG/ML
250 INJECTION, SOLUTION INTRAMUSCULAR PRN
Status: DISCONTINUED | OUTPATIENT
Start: 2024-05-05 | End: 2024-05-05

## 2024-05-05 RX ORDER — ONDANSETRON 4 MG/1
4 TABLET, ORALLY DISINTEGRATING ORAL EVERY 6 HOURS PRN
Status: DISCONTINUED | OUTPATIENT
Start: 2024-05-05 | End: 2024-05-05

## 2024-05-05 RX ORDER — FAMOTIDINE 10 MG/ML
INJECTION, SOLUTION INTRAVENOUS
Status: DISCONTINUED
Start: 2024-05-05 | End: 2024-05-05

## 2024-05-05 RX ORDER — KETOROLAC TROMETHAMINE 30 MG/ML
30 INJECTION, SOLUTION INTRAMUSCULAR; INTRAVENOUS EVERY 6 HOURS
Status: DISPENSED | OUTPATIENT
Start: 2024-05-05 | End: 2024-05-06

## 2024-05-05 RX ORDER — SODIUM CHLORIDE 9 MG/ML
25 INJECTION, SOLUTION INTRAVENOUS PRN
Status: DISCONTINUED | OUTPATIENT
Start: 2024-05-05 | End: 2024-05-05

## 2024-05-05 RX ORDER — DEXMEDETOMIDINE HYDROCHLORIDE 100 UG/ML
INJECTION, SOLUTION INTRAVENOUS PRN
Status: DISCONTINUED | OUTPATIENT
Start: 2024-05-05 | End: 2024-05-21 | Stop reason: SDUPTHER

## 2024-05-05 RX ORDER — SODIUM CHLORIDE, SODIUM LACTATE, POTASSIUM CHLORIDE, AND CALCIUM CHLORIDE .6; .31; .03; .02 G/100ML; G/100ML; G/100ML; G/100ML
1000 INJECTION, SOLUTION INTRAVENOUS PRN
Status: DISCONTINUED | OUTPATIENT
Start: 2024-05-05 | End: 2024-05-05

## 2024-05-05 RX ORDER — LANOLIN 100 %
OINTMENT (GRAM) TOPICAL
Status: DISCONTINUED | OUTPATIENT
Start: 2024-05-05 | End: 2024-05-08 | Stop reason: HOSPADM

## 2024-05-05 RX ORDER — LIDOCAINE HYDROCHLORIDE 20 MG/ML
INJECTION, SOLUTION EPIDURAL; INFILTRATION; INTRACAUDAL; PERINEURAL PRN
Status: DISCONTINUED | OUTPATIENT
Start: 2024-05-05 | End: 2024-05-21 | Stop reason: SDUPTHER

## 2024-05-05 RX ORDER — SODIUM CHLORIDE 0.9 % (FLUSH) 0.9 %
5-40 SYRINGE (ML) INJECTION EVERY 12 HOURS SCHEDULED
Status: DISCONTINUED | OUTPATIENT
Start: 2024-05-05 | End: 2024-05-05

## 2024-05-05 RX ORDER — IBUPROFEN 800 MG/1
800 TABLET ORAL EVERY 8 HOURS
Status: DISCONTINUED | OUTPATIENT
Start: 2024-05-06 | End: 2024-05-06

## 2024-05-05 RX ORDER — OXYCODONE HYDROCHLORIDE 5 MG/1
5 TABLET ORAL EVERY 4 HOURS PRN
Status: DISCONTINUED | OUTPATIENT
Start: 2024-05-05 | End: 2024-05-08 | Stop reason: HOSPADM

## 2024-05-05 RX ORDER — MISOPROSTOL 100 UG/1
400 TABLET ORAL PRN
Status: DISCONTINUED | OUTPATIENT
Start: 2024-05-05 | End: 2024-05-05

## 2024-05-05 RX ORDER — ONDANSETRON 2 MG/ML
4 INJECTION INTRAMUSCULAR; INTRAVENOUS EVERY 6 HOURS PRN
Status: DISCONTINUED | OUTPATIENT
Start: 2024-05-05 | End: 2024-05-08 | Stop reason: HOSPADM

## 2024-05-05 RX ORDER — SODIUM CHLORIDE, SODIUM LACTATE, POTASSIUM CHLORIDE, AND CALCIUM CHLORIDE .6; .31; .03; .02 G/100ML; G/100ML; G/100ML; G/100ML
500 INJECTION, SOLUTION INTRAVENOUS PRN
Status: DISCONTINUED | OUTPATIENT
Start: 2024-05-05 | End: 2024-05-05

## 2024-05-05 RX ORDER — FENTANYL CITRATE 50 UG/ML
INJECTION, SOLUTION INTRAMUSCULAR; INTRAVENOUS PRN
Status: DISCONTINUED | OUTPATIENT
Start: 2024-05-05 | End: 2024-05-21 | Stop reason: SDUPTHER

## 2024-05-05 RX ORDER — TERBUTALINE SULFATE 1 MG/ML
0.25 INJECTION, SOLUTION SUBCUTANEOUS
Status: DISCONTINUED | OUTPATIENT
Start: 2024-05-05 | End: 2024-05-05

## 2024-05-05 RX ORDER — SODIUM CHLORIDE 9 MG/ML
INJECTION, SOLUTION INTRAVENOUS PRN
Status: DISCONTINUED | OUTPATIENT
Start: 2024-05-05 | End: 2024-05-08 | Stop reason: HOSPADM

## 2024-05-05 RX ORDER — SODIUM CHLORIDE, SODIUM LACTATE, POTASSIUM CHLORIDE, CALCIUM CHLORIDE 600; 310; 30; 20 MG/100ML; MG/100ML; MG/100ML; MG/100ML
INJECTION, SOLUTION INTRAVENOUS CONTINUOUS
Status: DISCONTINUED | OUTPATIENT
Start: 2024-05-05 | End: 2024-05-05

## 2024-05-05 RX ORDER — OXYTOCIN 10 [USP'U]/ML
INJECTION, SOLUTION INTRAMUSCULAR; INTRAVENOUS PRN
Status: DISCONTINUED | OUTPATIENT
Start: 2024-05-05 | End: 2024-05-21 | Stop reason: SDUPTHER

## 2024-05-05 RX ORDER — AZITHROMYCIN 500 MG/1
INJECTION, POWDER, LYOPHILIZED, FOR SOLUTION INTRAVENOUS
Status: DISCONTINUED
Start: 2024-05-05 | End: 2024-05-05

## 2024-05-05 RX ORDER — ONDANSETRON 4 MG/1
4 TABLET, ORALLY DISINTEGRATING ORAL EVERY 8 HOURS PRN
Status: DISCONTINUED | OUTPATIENT
Start: 2024-05-05 | End: 2024-05-08 | Stop reason: HOSPADM

## 2024-05-05 RX ORDER — BUPIVACAINE HYDROCHLORIDE 2.5 MG/ML
INJECTION, SOLUTION EPIDURAL; INFILTRATION; INTRACAUDAL PRN
Status: DISCONTINUED | OUTPATIENT
Start: 2024-05-05 | End: 2024-05-21 | Stop reason: SDUPTHER

## 2024-05-05 RX ORDER — MORPHINE SULFATE 0.5 MG/ML
INJECTION, SOLUTION EPIDURAL; INTRATHECAL; INTRAVENOUS PRN
Status: DISCONTINUED | OUTPATIENT
Start: 2024-05-05 | End: 2024-05-21 | Stop reason: SDUPTHER

## 2024-05-05 RX ORDER — ACETAMINOPHEN 500 MG
1000 TABLET ORAL EVERY 8 HOURS SCHEDULED
Status: DISCONTINUED | OUTPATIENT
Start: 2024-05-05 | End: 2024-05-08 | Stop reason: HOSPADM

## 2024-05-05 RX ORDER — LIDOCAINE HYDROCHLORIDE AND EPINEPHRINE BITARTRATE 20; .01 MG/ML; MG/ML
INJECTION, SOLUTION SUBCUTANEOUS PRN
Status: DISCONTINUED | OUTPATIENT
Start: 2024-05-05 | End: 2024-05-21 | Stop reason: SDUPTHER

## 2024-05-05 RX ORDER — ACETAMINOPHEN 325 MG/1
650 TABLET ORAL EVERY 4 HOURS PRN
Status: DISCONTINUED | OUTPATIENT
Start: 2024-05-05 | End: 2024-05-05

## 2024-05-05 RX ORDER — METHYLERGONOVINE MALEATE 0.2 MG/ML
200 INJECTION INTRAVENOUS PRN
Status: DISCONTINUED | OUTPATIENT
Start: 2024-05-05 | End: 2024-05-05

## 2024-05-05 RX ORDER — SODIUM CHLORIDE 0.9 % (FLUSH) 0.9 %
5-40 SYRINGE (ML) INJECTION PRN
Status: DISCONTINUED | OUTPATIENT
Start: 2024-05-05 | End: 2024-05-08 | Stop reason: HOSPADM

## 2024-05-05 RX ORDER — ONDANSETRON 2 MG/ML
4 INJECTION INTRAMUSCULAR; INTRAVENOUS EVERY 6 HOURS PRN
Status: DISCONTINUED | OUTPATIENT
Start: 2024-05-05 | End: 2024-05-05

## 2024-05-05 RX ADMIN — BUPIVACAINE HYDROCHLORIDE 5 ML: 5 INJECTION, SOLUTION EPIDURAL; INTRACAUDAL at 15:58

## 2024-05-05 RX ADMIN — BUPIVACAINE HYDROCHLORIDE 5 ML: 5 INJECTION, SOLUTION EPIDURAL; INTRACAUDAL at 17:38

## 2024-05-05 RX ADMIN — MIDAZOLAM 2 MG: 1 INJECTION INTRAMUSCULAR; INTRAVENOUS at 16:27

## 2024-05-05 RX ADMIN — Medication 15 ML/HR: at 06:33

## 2024-05-05 RX ADMIN — KETOROLAC TROMETHAMINE 30 MG: 30 INJECTION, SOLUTION INTRAMUSCULAR at 18:57

## 2024-05-05 RX ADMIN — DEXMEDETOMIDINE 20 MCG: 100 INJECTION, SOLUTION INTRAVENOUS at 07:56

## 2024-05-05 RX ADMIN — SODIUM CHLORIDE, POTASSIUM CHLORIDE, SODIUM LACTATE AND CALCIUM CHLORIDE: 600; 310; 30; 20 INJECTION, SOLUTION INTRAVENOUS at 15:36

## 2024-05-05 RX ADMIN — DEXMEDETOMIDINE HYDROCHLORIDE 8 MCG: 100 INJECTION, SOLUTION INTRAVENOUS at 17:27

## 2024-05-05 RX ADMIN — LIDOCAINE HYDROCHLORIDE AND EPINEPHRINE 3 ML: 20; 10 INJECTION, SOLUTION INFILTRATION; PERINEURAL at 06:23

## 2024-05-05 RX ADMIN — DEXMEDETOMIDINE 8 MCG: 100 INJECTION, SOLUTION INTRAVENOUS at 16:49

## 2024-05-05 RX ADMIN — MORPHINE SULFATE 2.5 MG: 0.5 INJECTION EPIDURAL; INTRATHECAL; INTRAVENOUS at 15:19

## 2024-05-05 RX ADMIN — BUPIVACAINE HYDROCHLORIDE 5 ML: 2.5 INJECTION, SOLUTION EPIDURAL; INFILTRATION; INTRACAUDAL; PERINEURAL at 06:25

## 2024-05-05 RX ADMIN — BUPIVACAINE HYDROCHLORIDE 5 ML: 5 INJECTION, SOLUTION EPIDURAL; INTRACAUDAL; PERINEURAL at 17:38

## 2024-05-05 RX ADMIN — BUPIVACAINE HYDROCHLORIDE 5 ML: 2.5 INJECTION, SOLUTION EPIDURAL; INFILTRATION; INTRACAUDAL; PERINEURAL at 06:30

## 2024-05-05 RX ADMIN — FENTANYL CITRATE 50 MCG: 50 INJECTION, SOLUTION INTRAMUSCULAR; INTRAVENOUS at 15:09

## 2024-05-05 RX ADMIN — DEXMEDETOMIDINE HYDROCHLORIDE 8 MCG: 100 INJECTION, SOLUTION INTRAVENOUS at 17:38

## 2024-05-05 RX ADMIN — DEXMEDETOMIDINE 20 MCG: 100 INJECTION, SOLUTION INTRAVENOUS at 14:34

## 2024-05-05 RX ADMIN — BUPIVACAINE HYDROCHLORIDE 5 ML: 5 INJECTION, SOLUTION EPIDURAL; INTRACAUDAL; PERINEURAL at 16:49

## 2024-05-05 RX ADMIN — DEXMEDETOMIDINE HYDROCHLORIDE 8 MCG: 100 INJECTION, SOLUTION INTRAVENOUS at 17:32

## 2024-05-05 RX ADMIN — BUPIVACAINE HYDROCHLORIDE 3 ML: 5 INJECTION, SOLUTION EPIDURAL; INTRACAUDAL; PERINEURAL at 16:58

## 2024-05-05 RX ADMIN — SODIUM CHLORIDE, POTASSIUM CHLORIDE, SODIUM LACTATE AND CALCIUM CHLORIDE: 600; 310; 30; 20 INJECTION, SOLUTION INTRAVENOUS at 17:05

## 2024-05-05 RX ADMIN — ACETAMINOPHEN 1000 MG: 500 TABLET ORAL at 23:19

## 2024-05-05 RX ADMIN — BUPIVACAINE HYDROCHLORIDE 5 ML: 5 INJECTION, SOLUTION EPIDURAL; INTRACAUDAL at 16:26

## 2024-05-05 RX ADMIN — DEXMEDETOMIDINE HYDROCHLORIDE 8 MCG: 100 INJECTION, SOLUTION INTRAVENOUS at 17:17

## 2024-05-05 RX ADMIN — SODIUM CHLORIDE, POTASSIUM CHLORIDE, SODIUM LACTATE AND CALCIUM CHLORIDE: 600; 310; 30; 20 INJECTION, SOLUTION INTRAVENOUS at 06:34

## 2024-05-05 RX ADMIN — Medication 87.3 MILLI-UNITS/MIN: at 18:25

## 2024-05-05 RX ADMIN — Medication 2000 MG: at 17:48

## 2024-05-05 RX ADMIN — AZITHROMYCIN MONOHYDRATE 500 MG: 500 INJECTION, POWDER, LYOPHILIZED, FOR SOLUTION INTRAVENOUS at 14:40

## 2024-05-05 RX ADMIN — SODIUM CHLORIDE, POTASSIUM CHLORIDE, SODIUM LACTATE AND CALCIUM CHLORIDE: 600; 310; 30; 20 INJECTION, SOLUTION INTRAVENOUS at 15:07

## 2024-05-05 RX ADMIN — SODIUM CHLORIDE, POTASSIUM CHLORIDE, SODIUM LACTATE AND CALCIUM CHLORIDE: 600; 310; 30; 20 INJECTION, SOLUTION INTRAVENOUS at 10:33

## 2024-05-05 RX ADMIN — Medication 2000 MG: at 14:40

## 2024-05-05 RX ADMIN — OXYTOCIN 20 UNITS: 10 INJECTION INTRAVENOUS at 15:07

## 2024-05-05 RX ADMIN — LIDOCAINE HYDROCHLORIDE 5 ML: 20 INJECTION, SOLUTION EPIDURAL; INFILTRATION; INTRACAUDAL; PERINEURAL at 14:38

## 2024-05-05 RX ADMIN — LIDOCAINE HYDROCHLORIDE 5 ML: 20 INJECTION, SOLUTION EPIDURAL; INFILTRATION; INTRACAUDAL; PERINEURAL at 14:34

## 2024-05-05 RX ADMIN — BUPIVACAINE HYDROCHLORIDE 8 ML: 5 INJECTION, SOLUTION EPIDURAL; INTRACAUDAL; PERINEURAL at 07:56

## 2024-05-05 RX ADMIN — LIDOCAINE HYDROCHLORIDE 5 ML: 20 INJECTION, SOLUTION EPIDURAL; INFILTRATION; INTRACAUDAL; PERINEURAL at 14:56

## 2024-05-05 ASSESSMENT — PAIN SCALES - GENERAL
PAINLEVEL_OUTOF10: 4
PAINLEVEL_OUTOF10: 4

## 2024-05-05 ASSESSMENT — PAIN DESCRIPTION - LOCATION: LOCATION: ABDOMEN

## 2024-05-05 ASSESSMENT — PAIN DESCRIPTION - ORIENTATION: ORIENTATION: MID

## 2024-05-05 NOTE — H&P
Department of Obstetrics and Gynecology   Obstetrics History and Physical        CHIEF COMPLAINT:  contractions    HISTORY OF PRESENT ILLNESS:      The patient is a 34 y.o. female at 39w6d.  OB History          2    Para        Term                AB        Living             SAB        IAB        Ectopic        Molar        Multiple        Live Births                Patient presents with a chief complaint as above and is being admitted for active phase labor    Estimated Due Date: Estimated Date of Delivery: 24    PRENATAL CARE:    Complicated by: none    PAST OB HISTORY:  OB History          2    Para        Term                AB        Living             SAB        IAB        Ectopic        Molar        Multiple        Live Births                    Past Medical History:        Diagnosis Date    Anemia     Infertility, female     PONV (postoperative nausea and vomiting)      Past Surgical History:        Procedure Laterality Date    ANKLE SURGERY Left     cartilage repair    DILATION AND CURETTAGE OF UTERUS N/A 2023    DILATATION AND CURETTAGE, VIDEO  HYSTEROSCOPY MYOSURE REMOVAL OF RETAINED PRODUCTS OF CONCEPTION/ENDOMETRIAL MASS performed by Za Zhu MD at [facility] OR    WISDOM TOOTH EXTRACTION       Allergies:  Gluten    Social History:    Social History     Socioeconomic History    Marital status:      Spouse name: Not on file    Number of children: Not on file    Years of education: Not on file    Highest education level: Not on file   Occupational History    Not on file   Tobacco Use    Smoking status: Never    Smokeless tobacco: Never   Vaping Use    Vaping Use: Never used   Substance and Sexual Activity    Alcohol use: Not Currently    Drug use: Never    Sexual activity: Not on file   Other Topics Concern    Not on file   Social History Narrative    Not on file     Social Determinants of Health     Financial Resource Strain: Not on file   Food Insecurity:

## 2024-05-05 NOTE — PLAN OF CARE
Problem: Vaginal Birth or  Section  Goal: Fetal and maternal status remain reassuring during the birth process  Outcome: Progressing     Problem: Postpartum  Goal: Experiences normal postpartum course  Outcome: Progressing  Goal: Appropriate maternal -  bonding  Outcome: Progressing  Goal: Establishment of infant feeding pattern  Outcome: Progressing  Goal: Incisions, wounds, or drain sites healing without S/S of infection  Outcome: Progressing     Problem: Pain  Goal: Verbalizes/displays adequate comfort level or baseline comfort level  Outcome: Progressing     Problem: Infection - Adult  Goal: Absence of infection at discharge  Outcome: Progressing  Goal: Absence of infection during hospitalization  Outcome: Progressing  Goal: Absence of fever/infection during anticipated neutropenic period  Outcome: Progressing     Problem: Safety - Adult  Goal: Free from fall injury  Outcome: Progressing     Problem: Discharge Planning  Goal: Discharge to home or other facility with appropriate resources  Outcome: Progressing     Problem: Chronic Conditions and Co-morbidities  Goal: Patient's chronic conditions and co-morbidity symptoms are monitored and maintained or improved  Outcome: Progressing

## 2024-05-05 NOTE — OP NOTE
Operative note       Urology Op Note    Debora Morejon  YOB: 1990  [id number]    Pre-operative Diagnosis: Intraoperative bladder injury    Post-operative Diagnosis: Same    Procedure: Repair of intraoperative bladder injury with bilateral stent placement    Surgeon: Luis Rosario MD        Estimated Blood Loss: Less than 50 cc    Complications: none    History: Patient is a 34-year-old female undergoing  section.  I was called emergently due to bladder injury.  I was called for assessment and repair of the injury    Operative procedure detail: After informed consent have been obtained Dr. Ji had taken the patient to the operating room to perform emergency  section.  Patient had been in labor for quite some time and it was a very challenging  section during the course of the uterine repair, Dr. Sanchez noted that the Nam catheter was showing and that there was an intraoperative cystotomy.  I was called emerge to the operating room.  When I came into the room, the patient was prepped and draped and the abdomen was open.  The Nam catheter was seen through a rather large cystotomy and the uterus was present as well.  It took a little bit of time to identify all the structures.  We got a Bookwalter retractor from the main OR I was able to identify that the bladder had been incised from the trigone all the way up to the dome along the posterior wall.  We identified the ureteral orifice ease and placed a 6 Irish by 26 cm double-J stents of both ureteral orifice ease.  We freed up the bladder on either side of the pelvis and from the cervix and vagina.  We closed the bladder using a running 3-0 Monocryl posteriorly to the dome of the bladder.  We then used a 2-0 Monocryl to use a seromuscular imbricating suture as a second layer.  We then finished closing the bladder with 3-0 Monocryl in the mucosa and 2-0 Monocryl as a running seromuscular layer.  We are unable to irrigate

## 2024-05-05 NOTE — ANESTHESIA PROCEDURE NOTES
Epidural Block    Patient location during procedure: OB  Start time: 5/5/2024 6:16 AM  End time: 5/5/2024 6:24 AM  Reason for block: labor epidural  Staffing  Performed: resident/CRNA   Resident/CRNA: Christiane Thomas APRN - CRNA  Performed by: Christiane Thomas APRN - CRNA  Authorized by: Christiane Thomas APRN - CRNA    Epidural  Patient position: sitting  Prep: ChloraPrep  Patient monitoring: continuous pulse ox and frequent blood pressure checks  Approach: midline  Location: L3-4  Injection technique: GIAN saline  Provider prep: mask  Needle  Needle type: Tuohy   Needle insertion depth: 4 cm  Catheter type: multi-orifice  Catheter at skin depth: 9 cmCatheter Secured: tegaderm and tape  Assessment  Sensory level: T6  Hemodynamics: stable  Attempts: 1  Outcomes: uncomplicated  Preanesthetic Checklist  Completed: patient identified, IV checked, site marked, risks and benefits discussed, surgical/procedural consents, equipment checked, pre-op evaluation, timeout performed, anesthesia consent given, oxygen available, monitors applied/VS acknowledged, fire risk safety assessment completed and verbalized and blood product R/B/A discussed and consented

## 2024-05-05 NOTE — OP NOTE
PREOPERATIVE DIAGNOSES:     1.  39 week intrauterine pregnancy.  2.  Failure to descend in 2nd stage      POSTOPERATIVE DIAGNOSES:     Same.      PROCEDURE:  1.primary low transverse  section.                            2. Bladder repair by Dr Rosario      SURGEON:  Analia Ji MD      ESTIMATED BLOOD LOSS:  700 mL.      COMPLICATIONS:  Bladder injury requiring repair         ANESTHESIA:  epidural.         FINDINGS:     Viable female infant 7# 13 oz Apgars 7/9. Normal appearing uterus, tubes and ovaries    INDICATIONS FOR PROCEDURE:  The patient was admitted in active labor. Her prenatal course had been uncomplicated. She received an epidural and had AROM revealing clear fluid. She progressed well and became complete. She pushed for 3 hours but was not able to progress past +2 station. Overall the tracing was reassuring, however there were decelerations whenever the patient's position was changed. It was decided to proceed with a primary c/s. The risks and benefits were reviewed with the patient.    DETAILS OF OPERATION;      The patient was taken to the operating room where her epidural was found to be adequate. The fetal pillow was placed. She was then prepped and draped in the usual sterile fashion for an abdominal procedure. A timeout was performed. A low pfannenstiel incision was made in the skin with a knife and carried down through the fat to the fascia. The fascia was nicked in the midline and the incision was extended laterally with scissors. The fascia was then bluntly and sharply dissected away from the underlying muscle. The muscle was  in the midline and the peritoneum was entered bluntly. The opening was then extended superiorly and inferiorly, a bladder blade was inserted and a bladder flap was formed using blunt and sharp dissection. A low transverse incision was then made in the uterus with a knife and extended bluntly. The infant was delivered from a cephalic presentation,

## 2024-05-05 NOTE — PLAN OF CARE
Problem: Vaginal Birth or  Section  Goal: Fetal and maternal status remain reassuring during the birth process  Description:  Birth OB-Pregnancy care plan goal which identifies if the fetal and maternal status remain reassuring during the birth process  2024 075 by Austyn Blake RN  Outcome: Progressing  2024 by Marlena Carreon RN  Outcome: Progressing     Problem: Postpartum  Goal: Experiences normal postpartum course  Description:  Postpartum OB-Pregnancy care plan goal which identifies if the mother is experiencing a normal postpartum course  2024 075 by Austyn Blake RN  Outcome: Progressing  2024 by Marlena Carreon RN  Outcome: Progressing  Goal: Appropriate maternal -  bonding  Description:  Postpartum OB-Pregnancy care plan goal which identifies if the mother and  are bonding appropriately  2024 by Austyn Blake RN  Outcome: Progressing  2024 by Marlena Carreon RN  Outcome: Progressing  Goal: Establishment of infant feeding pattern  Description:  Postpartum OB-Pregnancy care plan goal which identifies if the mother is establishing a feeding pattern with their   2024 075 by Austyn Blake RN  Outcome: Progressing  2024 by Marlena Carreon RN  Outcome: Progressing  Goal: Incisions, wounds, or drain sites healing without S/S of infection  2024 by Austyn Blake RN  Outcome: Progressing  2024 by Marlena Carreon RN  Outcome: Progressing     Problem: Pain  Goal: Verbalizes/displays adequate comfort level or baseline comfort level  2024 075 by Austyn Blake RN  Outcome: Progressing  2024 by Marlena Carreon RN  Outcome: Progressing     Problem: Infection - Adult  Goal: Absence of infection at discharge  2024 by Austyn Blake RN  Outcome: Progressing  2024 by Marlena Carreon RN  Outcome: Progressing  Goal: Absence of infection

## 2024-05-05 NOTE — ANESTHESIA PRE PROCEDURE
Department of Anesthesiology  Preprocedure Note       Name:  Debora Morejon   Age:  34 y.o.  :  1990                                          MRN:  [id number]         Date:  2024      Surgeon: * No surgeons listed *    Procedure: * No procedures listed *    Medications prior to admission:   Prior to Admission medications    Medication Sig Start Date End Date Taking? Authorizing Provider   aspirin 81 MG EC tablet Take 1 tablet by mouth daily    Esperanza Contreras MD   Cyanocobalamin (VITAMIN B 12) 500 MCG TABS Take by mouth daily  Patient not taking: Reported on 2024    Esperanza Contreras MD   Pyridoxine HCl (VITAMIN B-6 PO) Take 500 mg by mouth  Patient not taking: Reported on 2024    Esperanza Contreras MD   vitamin D (D-3-5) 125 MCG (5000 UT) CAPS capsule Take 5,000 Units by mouth daily  Patient not taking: Reported on 2024    Esperanza Contreras MD   Prenatal Vit-Fe Fumarate-FA (PRENATAL VITAMINS PO) Take by mouth daily    Esperanza Contreras MD   Multiple Vitamin (MULTIVITAMIN PO) Take by mouth daily  Patient not taking: Reported on 2024    Esperanza Contreras MD       Current medications:    Current Facility-Administered Medications   Medication Dose Route Frequency Provider Last Rate Last Admin    terbutaline (BRETHINE) injection 0.25 mg  0.25 mg SubCUTAneous Once PRN Analia Ji MD        lactated ringers IV soln infusion   IntraVENous Continuous Analia Ji  mL/hr at 24 0634 New Bag at 24 0634    lactated ringers bolus 500 mL  500 mL IntraVENous PRN Analia Ji MD        Or    lactated ringers bolus 1,000 mL  1,000 mL IntraVENous PRN Analia Ji MD        sodium chloride flush 0.9 % injection 5-40 mL  5-40 mL IntraVENous 2 times per day Analia Ji MD        sodium chloride flush 0.9 % injection 5-40 mL  5-40 mL IntraVENous PRN Analia Ji MD        0.9 % sodium chloride infusion  25 mL IntraVENous PRN Analia Ji MD

## 2024-05-06 LAB
ANION GAP SERPL CALCULATED.3IONS-SCNC: 6 MMOL/L (ref 3–16)
BUN SERPL-MCNC: 8 MG/DL (ref 7–20)
CALCIUM SERPL-MCNC: 7.8 MG/DL (ref 8.3–10.6)
CHLORIDE SERPL-SCNC: 104 MMOL/L (ref 99–110)
CO2 SERPL-SCNC: 23 MMOL/L (ref 21–32)
CREAT SERPL-MCNC: 0.8 MG/DL (ref 0.6–1.1)
DEPRECATED RDW RBC AUTO: 13.1 % (ref 12.4–15.4)
GFR SERPLBLD CREATININE-BSD FMLA CKD-EPI: >90 ML/MIN/{1.73_M2}
GLUCOSE SERPL-MCNC: 84 MG/DL (ref 70–99)
HCT VFR BLD AUTO: 28.5 % (ref 36–48)
HGB BLD-MCNC: 9.3 G/DL (ref 12–16)
MCH RBC QN AUTO: 30.4 PG (ref 26–34)
MCHC RBC AUTO-ENTMCNC: 32.7 G/DL (ref 31–36)
MCV RBC AUTO: 93 FL (ref 80–100)
PLATELET # BLD AUTO: 179 K/UL (ref 135–450)
PMV BLD AUTO: 8.6 FL (ref 5–10.5)
POTASSIUM SERPL-SCNC: 4 MMOL/L (ref 3.5–5.1)
RBC # BLD AUTO: 3.07 M/UL (ref 4–5.2)
REAGIN+T PALLIDUM IGG+IGM SERPL-IMP: NORMAL
SODIUM SERPL-SCNC: 133 MMOL/L (ref 136–145)
WBC # BLD AUTO: 15.4 K/UL (ref 4–11)

## 2024-05-06 PROCEDURE — 36415 COLL VENOUS BLD VENIPUNCTURE: CPT

## 2024-05-06 PROCEDURE — 6370000000 HC RX 637 (ALT 250 FOR IP): Performed by: OBSTETRICS & GYNECOLOGY

## 2024-05-06 PROCEDURE — 6360000002 HC RX W HCPCS: Performed by: OBSTETRICS & GYNECOLOGY

## 2024-05-06 PROCEDURE — 1220000000 HC SEMI PRIVATE OB R&B

## 2024-05-06 PROCEDURE — 2580000003 HC RX 258: Performed by: OBSTETRICS & GYNECOLOGY

## 2024-05-06 PROCEDURE — 85027 COMPLETE CBC AUTOMATED: CPT

## 2024-05-06 PROCEDURE — 80048 BASIC METABOLIC PNL TOTAL CA: CPT

## 2024-05-06 RX ORDER — SODIUM CHLORIDE, SODIUM LACTATE, POTASSIUM CHLORIDE, CALCIUM CHLORIDE 600; 310; 30; 20 MG/100ML; MG/100ML; MG/100ML; MG/100ML
INJECTION, SOLUTION INTRAVENOUS CONTINUOUS
Status: DISCONTINUED | OUTPATIENT
Start: 2024-05-06 | End: 2024-05-08 | Stop reason: HOSPADM

## 2024-05-06 RX ORDER — IBUPROFEN 800 MG/1
800 TABLET ORAL EVERY 8 HOURS
Status: DISCONTINUED | OUTPATIENT
Start: 2024-05-06 | End: 2024-05-08 | Stop reason: HOSPADM

## 2024-05-06 RX ORDER — FERROUS SULFATE 325(65) MG
325 TABLET ORAL 2 TIMES DAILY WITH MEALS
Status: DISCONTINUED | OUTPATIENT
Start: 2024-05-06 | End: 2024-05-08 | Stop reason: HOSPADM

## 2024-05-06 RX ORDER — CEFAZOLIN SODIUM IN 0.9 % NACL 2 G/100 ML
2000 PLASTIC BAG, INJECTION (ML) INTRAVENOUS EVERY 8 HOURS
Status: COMPLETED | OUTPATIENT
Start: 2024-05-06 | End: 2024-05-06

## 2024-05-06 RX ORDER — IBUPROFEN 800 MG/1
TABLET ORAL
Status: DISCONTINUED
Start: 2024-05-06 | End: 2024-05-06 | Stop reason: WASHOUT

## 2024-05-06 RX ADMIN — OXYCODONE 5 MG: 5 TABLET ORAL at 14:25

## 2024-05-06 RX ADMIN — FERROUS SULFATE TAB 325 MG (65 MG ELEMENTAL FE) 325 MG: 325 (65 FE) TAB at 20:27

## 2024-05-06 RX ADMIN — OXYCODONE 5 MG: 5 TABLET ORAL at 19:21

## 2024-05-06 RX ADMIN — KETOROLAC TROMETHAMINE 30 MG: 30 INJECTION, SOLUTION INTRAMUSCULAR at 07:55

## 2024-05-06 RX ADMIN — OXYCODONE 5 MG: 5 TABLET ORAL at 23:32

## 2024-05-06 RX ADMIN — KETOROLAC TROMETHAMINE 30 MG: 30 INJECTION, SOLUTION INTRAMUSCULAR at 00:59

## 2024-05-06 RX ADMIN — Medication 2000 MG: at 08:18

## 2024-05-06 RX ADMIN — ACETAMINOPHEN 1000 MG: 500 TABLET ORAL at 22:56

## 2024-05-06 RX ADMIN — SODIUM CHLORIDE, POTASSIUM CHLORIDE, SODIUM LACTATE AND CALCIUM CHLORIDE: 600; 310; 30; 20 INJECTION, SOLUTION INTRAVENOUS at 06:42

## 2024-05-06 RX ADMIN — DOCUSATE SODIUM 100 MG: 100 CAPSULE, LIQUID FILLED ORAL at 22:57

## 2024-05-06 RX ADMIN — FERROUS SULFATE TAB 325 MG (65 MG ELEMENTAL FE) 325 MG: 325 (65 FE) TAB at 19:21

## 2024-05-06 RX ADMIN — Medication 2000 MG: at 16:07

## 2024-05-06 RX ADMIN — ACETAMINOPHEN 1000 MG: 500 TABLET ORAL at 07:56

## 2024-05-06 RX ADMIN — Medication 10 ML: at 23:33

## 2024-05-06 RX ADMIN — IBUPROFEN 800 MG: 800 TABLET, FILM COATED ORAL at 16:34

## 2024-05-06 RX ADMIN — DOCUSATE SODIUM 100 MG: 100 CAPSULE, LIQUID FILLED ORAL at 07:55

## 2024-05-06 ASSESSMENT — PAIN DESCRIPTION - DESCRIPTORS: DESCRIPTORS: ACHING;SORE

## 2024-05-06 ASSESSMENT — PAIN DESCRIPTION - ORIENTATION: ORIENTATION: MID

## 2024-05-06 ASSESSMENT — PAIN SCALES - GENERAL
PAINLEVEL_OUTOF10: 7
PAINLEVEL_OUTOF10: 3
PAINLEVEL_OUTOF10: 5
PAINLEVEL_OUTOF10: 5

## 2024-05-06 ASSESSMENT — PAIN DESCRIPTION - LOCATION
LOCATION: ABDOMEN

## 2024-05-06 ASSESSMENT — PAIN - FUNCTIONAL ASSESSMENT: PAIN_FUNCTIONAL_ASSESSMENT: ACTIVITIES ARE NOT PREVENTED

## 2024-05-06 NOTE — PLAN OF CARE
Problem: Vaginal Birth or  Section  Goal: Fetal and maternal status remain reassuring during the birth process  Description:  Birth OB-Pregnancy care plan goal which identifies if the fetal and maternal status remain reassuring during the birth process  2024 by Christiano Oneil RN  Outcome: Completed     Problem: Postpartum  Goal: Experiences normal postpartum course  Description:  Postpartum OB-Pregnancy care plan goal which identifies if the mother is experiencing a normal postpartum course  2024 1347 by Kimberly Mccurdy RN  Outcome: Progressing  2024 by Christiano Oneil RN  Outcome: Progressing  Goal: Appropriate maternal -  bonding  Description:  Postpartum OB-Pregnancy care plan goal which identifies if the mother and  are bonding appropriately  2024 134 by Kimberly Mccurdy RN  Outcome: Progressing  2024 by Christiano Oneil RN  Outcome: Progressing  Goal: Establishment of infant feeding pattern  Description:  Postpartum OB-Pregnancy care plan goal which identifies if the mother is establishing a feeding pattern with their   2024 134 by Kimberly Mccurdy RN  Outcome: Progressing  2024 by Christiano Oneil RN  Outcome: Progressing  Goal: Incisions, wounds, or drain sites healing without S/S of infection  2024 by Kimberly Mccurdy RN  Outcome: Progressing  2024 by Christiano Oneil RN  Outcome: Progressing     Problem: Pain  Goal: Verbalizes/displays adequate comfort level or baseline comfort level  2024 1347 by Kimberly Mccurdy RN  Outcome: Progressing  2024 by Christiano Oneil RN  Outcome: Progressing     Problem: Infection - Adult  Goal: Absence of infection at discharge  2024 134 by Kimberly Mccurdy RN  Outcome: Progressing  2024 by Christiano Oneil RN  Outcome: Progressing  Goal: Absence of infection during hospitalization  2024 134 by Kimberly Mccurdy

## 2024-05-06 NOTE — ANESTHESIA POSTPROCEDURE EVALUATION
Department of Anesthesiology  Postprocedure Note    Patient: Debora Morejon  MRN: [id number]  YOB: 1990  Date of evaluation: 2024    Procedure Summary       Date: 24 Room / Location: [facility] [address] [place] [address]    Anesthesia Start: 605 Anesthesia Stop:     Procedures:        SECTION (Abdomen)      Labor Analgesia Diagnosis:       Term pregnancy      (Term pregnancy [Z34.90])    Surgeons: Analia Ji MD Responsible Provider: Leandro Trujillo MD    Anesthesia Type: epidural ASA Status: 2 - Emergent            Anesthesia Type: No value filed.    Amol Phase I: Amol Score: 9    Amol Phase II: Amol Score: 10    Anesthesia Post Evaluation    Level of consciousness: awake  Cardiovascular status: hemodynamically stable  Respiratory status: acceptable  Comments: No apparent complications from neuraxial anesthesia.  Pain management: adequate        No notable events documented.

## 2024-05-07 LAB
ANION GAP SERPL CALCULATED.3IONS-SCNC: 7 MMOL/L (ref 3–16)
BUN SERPL-MCNC: 7 MG/DL (ref 7–20)
CALCIUM SERPL-MCNC: 7.9 MG/DL (ref 8.3–10.6)
CHLORIDE SERPL-SCNC: 107 MMOL/L (ref 99–110)
CO2 SERPL-SCNC: 24 MMOL/L (ref 21–32)
CREAT SERPL-MCNC: 0.6 MG/DL (ref 0.6–1.1)
GFR SERPLBLD CREATININE-BSD FMLA CKD-EPI: >90 ML/MIN/{1.73_M2}
GLUCOSE SERPL-MCNC: 94 MG/DL (ref 70–99)
POTASSIUM SERPL-SCNC: 3.9 MMOL/L (ref 3.5–5.1)
SODIUM SERPL-SCNC: 138 MMOL/L (ref 136–145)

## 2024-05-07 PROCEDURE — 6370000000 HC RX 637 (ALT 250 FOR IP): Performed by: OBSTETRICS & GYNECOLOGY

## 2024-05-07 PROCEDURE — 36415 COLL VENOUS BLD VENIPUNCTURE: CPT

## 2024-05-07 PROCEDURE — 6370000000 HC RX 637 (ALT 250 FOR IP)

## 2024-05-07 PROCEDURE — 80048 BASIC METABOLIC PNL TOTAL CA: CPT

## 2024-05-07 PROCEDURE — 1220000000 HC SEMI PRIVATE OB R&B

## 2024-05-07 PROCEDURE — 2580000003 HC RX 258: Performed by: OBSTETRICS & GYNECOLOGY

## 2024-05-07 RX ORDER — SIMETHICONE 80 MG
80 TABLET,CHEWABLE ORAL EVERY 6 HOURS PRN
Status: DISCONTINUED | OUTPATIENT
Start: 2024-05-07 | End: 2024-05-08 | Stop reason: HOSPADM

## 2024-05-07 RX ADMIN — SIMETHICONE 80 MG: 80 TABLET, CHEWABLE ORAL at 18:59

## 2024-05-07 RX ADMIN — SIMETHICONE 80 MG: 80 TABLET, CHEWABLE ORAL at 06:35

## 2024-05-07 RX ADMIN — ACETAMINOPHEN 1000 MG: 500 TABLET ORAL at 06:34

## 2024-05-07 RX ADMIN — ACETAMINOPHEN 1000 MG: 500 TABLET ORAL at 18:03

## 2024-05-07 RX ADMIN — IBUPROFEN 800 MG: 800 TABLET, FILM COATED ORAL at 13:30

## 2024-05-07 RX ADMIN — IBUPROFEN 800 MG: 800 TABLET, FILM COATED ORAL at 03:34

## 2024-05-07 RX ADMIN — SIMETHICONE 80 MG: 80 TABLET, CHEWABLE ORAL at 13:31

## 2024-05-07 RX ADMIN — FERROUS SULFATE TAB 325 MG (65 MG ELEMENTAL FE) 325 MG: 325 (65 FE) TAB at 07:23

## 2024-05-07 RX ADMIN — OXYCODONE 5 MG: 5 TABLET ORAL at 03:35

## 2024-05-07 RX ADMIN — SIMETHICONE 80 MG: 80 TABLET, CHEWABLE ORAL at 00:35

## 2024-05-07 RX ADMIN — FERROUS SULFATE TAB 325 MG (65 MG ELEMENTAL FE) 325 MG: 325 (65 FE) TAB at 18:34

## 2024-05-07 RX ADMIN — DOCUSATE SODIUM 100 MG: 100 CAPSULE, LIQUID FILLED ORAL at 07:23

## 2024-05-07 RX ADMIN — Medication 5 ML: at 07:31

## 2024-05-07 RX ADMIN — IBUPROFEN 800 MG: 800 TABLET, FILM COATED ORAL at 21:49

## 2024-05-07 ASSESSMENT — PAIN DESCRIPTION - LOCATION
LOCATION: ABDOMEN

## 2024-05-07 ASSESSMENT — PAIN SCALES - GENERAL
PAINLEVEL_OUTOF10: 4
PAINLEVEL_OUTOF10: 6
PAINLEVEL_OUTOF10: 3
PAINLEVEL_OUTOF10: 3

## 2024-05-07 ASSESSMENT — PAIN - FUNCTIONAL ASSESSMENT
PAIN_FUNCTIONAL_ASSESSMENT: ACTIVITIES ARE NOT PREVENTED

## 2024-05-07 ASSESSMENT — PAIN DESCRIPTION - DESCRIPTORS
DESCRIPTORS: SORE
DESCRIPTORS: ACHING

## 2024-05-07 ASSESSMENT — PAIN DESCRIPTION - ORIENTATION
ORIENTATION: MID
ORIENTATION: LOWER

## 2024-05-07 NOTE — PLAN OF CARE
Problem: Postpartum  Goal: Experiences normal postpartum course  Description:  Postpartum OB-Pregnancy care plan goal which identifies if the mother is experiencing a normal postpartum course  2024 by Daniela Lake RN  Outcome: Progressing  2024 by Mallika Browning RN  Outcome: Progressing  Goal: Appropriate maternal -  bonding  Description:  Postpartum OB-Pregnancy care plan goal which identifies if the mother and  are bonding appropriately  2024 by Daniela Lake RN  Outcome: Progressing  2024 by Mallika Browning RN  Outcome: Progressing  Goal: Establishment of infant feeding pattern  Description:  Postpartum OB-Pregnancy care plan goal which identifies if the mother is establishing a feeding pattern with their   2024 by Daniela Lake RN  Outcome: Progressing  2024 by Mallika Browning RN  Outcome: Progressing  Goal: Incisions, wounds, or drain sites healing without S/S of infection  2024 by Daniela Lake RN  Outcome: Progressing  2024 by Mallika Browning RN  Outcome: Progressing     Problem: Pain  Goal: Verbalizes/displays adequate comfort level or baseline comfort level  2024 by Daniela Lake RN  Outcome: Progressing  2024 by Mallika Browning RN  Outcome: Progressing     Problem: Infection - Adult  Goal: Absence of infection at discharge  2024 by Daniela Lake RN  Outcome: Progressing  2024 by Mallika Browning RN  Outcome: Progressing  Goal: Absence of infection during hospitalization  2024 by Daniela Lake RN  Outcome: Progressing  2024 by Mallika Browning RN  Outcome: Progressing  Goal: Absence of fever/infection during anticipated neutropenic period  2024 by Daniela Lake RN  Outcome: Progressing  2024 by Mallika Browning RN  Outcome: Progressing     Problem: Safety - Adult  Goal: Free from fall injury  2024

## 2024-05-07 NOTE — PLAN OF CARE
Problem: Postpartum  Goal: Experiences normal postpartum course  Description:  Postpartum OB-Pregnancy care plan goal which identifies if the mother is experiencing a normal postpartum course  2024 by Mallika Browning RN  Outcome: Progressing     Problem: Postpartum  Goal: Appropriate maternal -  bonding  Description:  Postpartum OB-Pregnancy care plan goal which identifies if the mother and  are bonding appropriately  2024 by Mallika Browning RN  Outcome: Progressing     Problem: Postpartum  Goal: Establishment of infant feeding pattern  Description:  Postpartum OB-Pregnancy care plan goal which identifies if the mother is establishing a feeding pattern with their   2024 by Mallika Browning RN  Outcome: Progressing     Problem: Postpartum  Goal: Incisions, wounds, or drain sites healing without S/S of infection  2024 by Mallika Browning RN  Outcome: Progressing     Problem: Pain  Goal: Verbalizes/displays adequate comfort level or baseline comfort level  2024 by Mallika Browning RN  Outcome: Progressing     Problem: Infection - Adult  Goal: Absence of infection at discharge  2024 by Mallika Browning RN  Outcome: Progressing     Problem: Infection - Adult  Goal: Absence of infection during hospitalization  2024 by Mallika Browning RN  Outcome: Progressing     Problem: Infection - Adult  Goal: Absence of fever/infection during anticipated neutropenic period  2024 by Mallika Browning RN  Outcome: Progressing     Problem: Safety - Adult  Goal: Free from fall injury  2024 by Mallika Browning RN  Outcome: Progressing     Problem: Discharge Planning  Goal: Discharge to home or other facility with appropriate resources  2024 by Mallika Browning RN  Outcome: Progressing     Problem: Chronic Conditions and Co-morbidities  Goal: Patient's chronic conditions and co-morbidity symptoms are monitored and maintained or

## 2024-05-08 VITALS
HEART RATE: 61 BPM | HEIGHT: 69 IN | WEIGHT: 165 LBS | OXYGEN SATURATION: 98 % | DIASTOLIC BLOOD PRESSURE: 74 MMHG | TEMPERATURE: 98.3 F | SYSTOLIC BLOOD PRESSURE: 129 MMHG | BODY MASS INDEX: 24.44 KG/M2 | RESPIRATION RATE: 16 BRPM

## 2024-05-08 PROCEDURE — 6370000000 HC RX 637 (ALT 250 FOR IP): Performed by: OBSTETRICS & GYNECOLOGY

## 2024-05-08 RX ORDER — FERROUS SULFATE 325(65) MG
325 TABLET ORAL EVERY OTHER DAY
Qty: 30 TABLET | Refills: 0 | Status: SHIPPED | OUTPATIENT
Start: 2024-05-08

## 2024-05-08 RX ORDER — IBUPROFEN 800 MG/1
800 TABLET ORAL EVERY 8 HOURS PRN
Qty: 25 TABLET | Refills: 0 | Status: SHIPPED | OUTPATIENT
Start: 2024-05-08

## 2024-05-08 RX ORDER — OXYCODONE HYDROCHLORIDE 5 MG/1
5 TABLET ORAL EVERY 6 HOURS PRN
Qty: 4 TABLET | Refills: 0 | Status: SHIPPED | OUTPATIENT
Start: 2024-05-08 | End: 2024-05-11

## 2024-05-08 RX ADMIN — SIMETHICONE 80 MG: 80 TABLET, CHEWABLE ORAL at 08:01

## 2024-05-08 RX ADMIN — SIMETHICONE 80 MG: 80 TABLET, CHEWABLE ORAL at 14:16

## 2024-05-08 RX ADMIN — IBUPROFEN 800 MG: 800 TABLET, FILM COATED ORAL at 14:16

## 2024-05-08 RX ADMIN — IBUPROFEN 800 MG: 800 TABLET, FILM COATED ORAL at 06:28

## 2024-05-08 RX ADMIN — ACETAMINOPHEN 1000 MG: 500 TABLET ORAL at 02:06

## 2024-05-08 RX ADMIN — ACETAMINOPHEN 1000 MG: 500 TABLET ORAL at 10:03

## 2024-05-08 RX ADMIN — SIMETHICONE 80 MG: 80 TABLET, CHEWABLE ORAL at 02:06

## 2024-05-08 RX ADMIN — FERROUS SULFATE TAB 325 MG (65 MG ELEMENTAL FE) 325 MG: 325 (65 FE) TAB at 08:01

## 2024-05-08 ASSESSMENT — PAIN SCALES - GENERAL
PAINLEVEL_OUTOF10: 2
PAINLEVEL_OUTOF10: 2

## 2024-05-08 ASSESSMENT — PAIN - FUNCTIONAL ASSESSMENT: PAIN_FUNCTIONAL_ASSESSMENT: ACTIVITIES ARE NOT PREVENTED

## 2024-05-08 ASSESSMENT — PAIN DESCRIPTION - LOCATION
LOCATION: ABDOMEN;INCISION
LOCATION: INCISION

## 2024-05-08 ASSESSMENT — PAIN DESCRIPTION - DESCRIPTORS: DESCRIPTORS: BURNING;DISCOMFORT

## 2024-05-08 ASSESSMENT — PAIN DESCRIPTION - ORIENTATION: ORIENTATION: LOWER;RIGHT

## 2024-05-08 NOTE — PROGRESS NOTES
Urology Progress Note    POD1 repair of intraoperative bladder injury with bilateral stent placement     Subjective: April Darleen is feeling better.  No concerns with fierro    Vitals:  /66   Pulse 84   Temp 99.2 °F (37.3 °C) (Oral)   Resp 18   Ht 1.753 m (5' 9\")   Wt 74.8 kg (165 lb)   LMP 2023   SpO2 100%   Breastfeeding Unknown   BMI 24.37 kg/m²   Temp  Av.3 °F (37.4 °C)  Min: 98.4 °F (36.9 °C)  Max: 100.6 °F (38.1 °C)    Intake/Output Summary (Last 24 hours) at 2024 0936  Last data filed at 2024 0830  Gross per 24 hour   Intake 5507.64 ml   Output 2250 ml   Net 3257.64 ml       Exam:   Physical:  Well developed, well nourished in no acute distress  Mood indicates no abnormalities. Pt doesn’t appear depressed  Orientated to time and place  Neck is supple, trachea is midline  Respiratory effort is normal  Abdomen is soft and not tender.  Surgical incision is covered with bandage       Female :   Fierro draining pink/cherry urine     Labs:  WBC:    Lab Results   Component Value Date/Time    WBC 15.4 2024 05:51 AM     Hemoglobin/Hematocrit:    Lab Results   Component Value Date/Time    HGB 9.3 2024 05:51 AM    HCT 28.5 2024 05:51 AM     BMP:    Lab Results   Component Value Date/Time     2024 08:04 AM    K 4.0 2024 08:04 AM     2024 08:04 AM    CO2 23 2024 08:04 AM    BUN 8 2024 08:04 AM    CREATININE 0.8 2024 08:04 AM    CALCIUM 7.8 2024 08:04 AM    LABGLOM >90 2024 08:04 AM     PT/INR:  No results found for: \"PROTIME\", \"INR\"  PTT:  No results found for: \"APTT\"[APTT      Impression/Plan:     35 yo female who is POD1  section with intraoperative repair of bladder injury and bilateral stent placement       - patient feeling better.  Serum creatinine WNL  - please monitor fierro output and call urology if there is decrease in drainage   - Fierro to remain in place for 1 week, we will arrange a 
    Pt Name: Debora Morejon  Medical Record Number: [id number]  Date of Birth 1990   Today's Date: 2024      Subjective:  She is having the urge to use the restroom this morning and voiding around the fierro catheter in good amounts, some have not been measurable as she missed the hat on occasions. Denies significant pain.     ROS: Constitutional: No fever    Vitals:  Vitals:    24 2332 24 0002 24 0631 24 0733   BP:   108/61 122/60   Pulse:   57 62   Resp: 18 16 16 18   Temp:   97.7 °F (36.5 °C) 98.4 °F (36.9 °C)   TempSrc:   Oral Oral   SpO2:    98%   Weight:       Height:         I/O last 3 completed shifts:  In: 2657.6 [I.V.:2457.6; IV Piggyback:200]  Out: 3100 [Urine:3100]    Exam:  General: Awake, oriented, no acute distress  Respiratory: Nonlabored breathing  Abdomen: Soft, appropriately tender, distended, no masses  : fierro catheter with cherry output  Skin: Skin color, texture, turgor normal, no rashes or lesions  Neurologic: no gross deficits    CURRENT MEDICATIONS   Scheduled Meds:   ferrous sulfate  325 mg Oral BID WC    ibuprofen  800 mg Oral Q8H    sodium chloride flush  5-40 mL IntraVENous 2 times per day    acetaminophen  1,000 mg Oral 3 times per day    docusate sodium  100 mg Oral BID    Tdap-Dtap  0.5 mL IntraMUSCular Prior to discharge     Continuous Infusions:   lactated ringers IV soln 125 mL/hr at 24 1605    sodium chloride       PRN Meds:.simethicone, sodium chloride flush, sodium chloride, ondansetron **OR** ondansetron, lanolin, oxyCODONE **OR** oxyCODONE    LABS     Recent Labs     24  0400 24  0551 24  0804 24  0605   WBC 12.1* 15.4*  --   --    HGB 12.2 9.3*  --   --    HCT 36.4 28.5*  --   --     179  --   --    NA  --   --  133* 138   K  --   --  4.0 3.9   CL  --   --  104 107   CO2  --   --  23 24   BUN  --   --  8 7   CREATININE  --   --  0.8 0.6   CALCIUM  --   --  7.8* 7.9*       ASSESSMENT   POD #2  
    Pt Name: Debora Morejon  Medical Record Number: [id number]  Date of Birth 1990   Today's Date: 2024      Subjective:  She did have a few dribbles of urine around the catheter as she attempted to have a BM this morning but no significant urine or urge to use the restroom since the catheter has been exchanged.     ROS: Constitutional: No fever    Vitals:  Vitals:    24 0733 24 1553 24 0005 24 0740   BP: 122/60 133/76 133/75 129/74   Pulse: 62 71 64 61   Resp: 18 16 16 16   Temp: 98.4 °F (36.9 °C) 98.4 °F (36.9 °C) 98.2 °F (36.8 °C) 98.3 °F (36.8 °C)   TempSrc: Oral Oral Oral Oral   SpO2: 98% 98% 98% 98%   Weight:       Height:         I/O last 3 completed shifts:  In: 10 [I.V.:10]  Out: 4230 [Urine:4230]    Exam:  General: Awake, oriented, no acute distress  Respiratory: Nonlabored breathing  Abdomen: Soft, appropriately tender, distended, no masses  : fierro catheter with yellow, slight pink tinge output  Skin: Skin color, texture, turgor normal, no rashes or lesions  Neurologic: no gross deficits    CURRENT MEDICATIONS   Scheduled Meds:   ferrous sulfate  325 mg Oral BID WC    ibuprofen  800 mg Oral Q8H    sodium chloride flush  5-40 mL IntraVENous 2 times per day    acetaminophen  1,000 mg Oral 3 times per day    docusate sodium  100 mg Oral BID    Tdap-Dtap  0.5 mL IntraMUSCular Prior to discharge     Continuous Infusions:   lactated ringers IV soln Stopped (24 1640)    sodium chloride       PRN Meds:.simethicone, sodium chloride flush, sodium chloride, ondansetron **OR** ondansetron, lanolin, oxyCODONE **OR** oxyCODONE    LABS     Recent Labs     24  0551 24  0804 24  0605   WBC 15.4*  --   --    HGB 9.3*  --   --    HCT 28.5*  --   --      --   --    NA  --  133* 138   K  --  4.0 3.9   CL  --  104 107   CO2  --  23 24   BUN  --  8 7   CREATININE  --  0.8 0.6   CALCIUM  --  7.8* 7.9*       ASSESSMENT   POD #2  section with repair of 
0611: CRNA in room  0624: Epidural in place  0625: Test dose given    Patient comfortable with epidural at this time.   
3 way firero placed at this time per urology order.  Patient reports stinging at insertion site.  Red urine returned in tubing.   
Bladder noticed to look distended while pushing by RN and Dr. Ji. ISC performed, only 100mL of red urine drained at this time.  
Department of Obstetrics and Gynecology  Labor and Delivery  Attending Post Partum Progress Note      SUBJECTIVE:  Pt without complaints, pain controlled, tolerating po, lochia wnl, the patient is currently breastfeeding. negative flatus.    OBJECTIVE:      Vitals:  Vitals:    24 0752   BP: 121/66   Pulse: 84   Resp: 18   Temp: 99.2 °F (37.3 °C)   SpO2: 100%       RRR CTAB  ABDOMEN:  soft, non-distended, non-tender, + BS  FF below umbilicus  Incision: c/d/I, bandage removed  EXT: no edema    DATA:    CBC:    Lab Results   Component Value Date/Time    WBC 15.4 2024 05:51 AM    HGB 9.3 2024 05:51 AM    HCT 28.5 2024 05:51 AM     2024 05:51 AM       ASSESSMENT & PLAN:    34 y.o.   OB History          2    Para   1    Term   1            AB        Living   1         SAB        IAB        Ectopic        Molar        Multiple   0    Live Births   1             s/p C/S, repair of intraoperative bladder injury/stent placemtnpod# 1,   1. Doing well, continue routine post-op care.  2. Urology following (see PN)  3.  Anemia of acute blood loss: PNV + Fe.         
Discharge Phone Call    Patient Name: Debora Morejon     OB Care Provider: Analia Ji MD Discharge Date: 2024    Disposition of baby:    Phone Number: [phone] (home)     Attempts to Contact:  Date:    Caller  Date:    Caller  Date:    Caller    Information for the patient's :  Silas Morejon April [[id number]]   Delivery Method: , Low Transverse     1.  Now that you are at home is your pain being well controlled?   Y/N   If no, instruct to call       provider.      2. Are you breastfeeding?    Y/N    Do you need any extra support from our lactation staff?      Y/N    If yes, provide number for lactation.  3. Have you made or already had your first appointment with the baby's doctor? Y/N   If no, do      you know when to schedule it?  Y/N    4. Have you scheduled your follow-up appointment?  Y/N  If no, do you know when to schedule       it?    Y/N   If no, they can find it on printed discharge instructions.  5. Did staff discuss safe sleep during your stay? Y/N   6. Did we explain things in a way you could understand?  Y/N  7. Were we respectful of your preferences for labor and birth and include you in the plan of       care?  Y/N  If no, please explain _______________________________________________  8. Is there anyone in particular you would like to mention who provided care for you? _______      _________________________________________________________________________     9. Were you given a Post-Birth Warning Signs handout?  Y/N  Do you have it somewhere      easily accessible?  Y/N  If no, please send them a copy and ask them to put it somewhere      easily found.  10. Have you been crying excessively, having anger or mood swings that feel out of control, or       feel like you can't cope with caring for yourself or baby? Y/N   If yes, they may be showing       signs of postpartum depression and should call provider. There is also a        depression test on page C5 in their 
Dr. Ji at bedside. SVE 2-3/70/-2. Order to admit pt. Will moved to room 381.  
Dr. Rosario arrived in OR #2 for bladder repair  
Dr. Rosario at bedside to discuss plan of care with patient.  Made him aware fierro replaced and draining urine into urometer at this time.  All questions answered. Verbalizes understanding.   
Efowlers CRNA at bedside for epidural redose.  
Fetal pillow placed by Dr. Ji. 180mL sterile saline placed.  
I was asked to assist Dr. Ji for primary  section as a second surgical tech was not available. I scrubbed in and assisted with the key portions of the  section as well as the bladder repair. Please see Dr. Ji's and Dr. Rosario's operative reports for further details.     Electronically signed by Melba Cutler DO on 2024 at 4:34 AM   
ID bands checked. Infant's ID band and Mother's matching ID bands removed and taped to discharge instruction sheet, the mother verified as correct and witnessed by RN.  Umbilical clamp and HUGS tag removed. Mom and  Infant discharged via wheelchair to private car.  Infant placed in car seat per parents.  Mom and baby accompanied by family and in stable condition.  
Nam bulb palpated over lower uterine segment prior to chloraprep. Dr. Ji aware.  
Nitrous Oxide started at this time.   
PT presented to OB triage unit with complaints of ctx. PT ambulated to T1. Oriented to room and telephone. Pt provided urine sample and was placed on EFM. Pt states she is feeling the baby move. Denies any leaking of fluid or vaginal bleeding. Pt started aury at 1100 yesterday but got worse around 1800. Rates her pain a 7 out of 10 during a contraction. Denies any additional pain/concerns at this time.   
Page placed to ISABELLE Benton CNP with Urology r/t d/c plan for pt. Urology group will contact pt at home after d/c to set up outpatient appointments within the next day or 2. Pt may go home with leg bag for fierro.  
Patient reports no further stinging at the insertion site. The catheter in place is draining without issue.   
Pt comfortable with epidural  Cx 7cm/80%/0  Tracing reassuring  Contractions q 2 min  
Pt pushed for 3 hours with minimal progress past +2 station with pushing. Attempts were made to reposition the patient on her side and in other positions to push, however the fetal heart rate had decelerations. The baseline has gradually increased as well.    The need for primary c/s was discussed with the patient and her  who agree. The consent form was reviewed and signed.  
Pt pushing, bladder looks distended. Noted by RN and Dr. Ji at bedside. Straight cath performed; no resistance. 50cc red urine drained.  
Pt reported decreased output in her fierro catheter, and that she was urinating into the toilet. This writer did note pt urinating into the toilet and 50ml output in her fierro cath. This RN did attempt to flush the catheter which was ineffective. Dr. Oh made aware.   
RN and provider at bedside. Patient agreeable to SVE and AROM. SVE 7/90/0 and AROM for clear fluid at this time.   
Report received from Jaqui RN. Bedside report given. Introduced myself to pt as her RN for the day. I put my name and phone number on the white board and showed pt how to use her room phone to get a hold of me. Pt was given her plan of care for the day. Call light within reach. Bed in lowest position and wheels are locked. Pt verbalized understanding and denies any further needs at this time.Continue to monitor.  
Spoke with Dr. Ji via telephone to update her on patient status.  Urine output adequate. No clots noted in fierro catheter.  Urine remains bright red. Fierro to remain in place.  Patient pain under control at this time.  States okay to turn fluids off at this time.  Will give 2 additional doses of Ancef 2g IV d/t patient with 100.6F temp this morning.  Will continue to monitor.   
Subjective:     Postpartum Day 2:  Delivery    The patient feels tired. The patient denies emotional concerns. Pain is moderately controlled with current medications. The baby iswell. Baby is feeding via breast. Urinary output is adequate. The patient is ambulating well. The patient is tolerating a normal diet. Flatus has been passed.    Objective:    VITALS:  /60   Pulse 62   Temp 98.4 °F (36.9 °C) (Oral)   Resp 18   Ht 1.753 m (5' 9\")   Wt 74.8 kg (165 lb)   LMP 2023   SpO2 98%   Breastfeeding Unknown   BMI 24.37 kg/m²     Vitals:    24   BP: 122/60   Pulse: 62   Resp: 18   Temp: 98.4 °F (36.9 °C)   SpO2: 98%         General:    alert, appears stated age, and cooperative   Bowel Sounds:  active   Lochia:  appropriate   Uterine Fundus:   firm   Incision:  healing well, no significant drainage, no dehiscence, no significant erythema   DVT Evaluation:  No evidence of DVT seen on physical exam.     CBC   Lab Results   Component Value Date    WBC 15.4 (H) 2024    HGB 9.3 (L) 2024    HCT 28.5 (L) 2024    MCV 93.0 2024     2024        Assessment:     POD # 2   section. Postoperative course complicated by bladder injury with fierro and stents, anemia     Plan:     Continue current care  Appreciate urology help  Continue to monitor output.  .  
This RN reviewed all documentation and supervised all care done by ISABELLE Browning RN.  
This writer placed a call to the urology group to notify them of decreased urinary output in the pts cath bag, as well as noted urinating into the toilet. Awaiting their response at this time.  
Urology addendum:    Patient will need Nam catheter in for 1 week.  Will get cystogram prior to Nam catheter removal.  Will need cystoscopy and bilateral stent removal in several weeks.    Luis Rosario MD  
Urine 05/05/2024 Neg  Negative <5 ng/ml Final    pH, Urine 05/05/2024 6.0   Final    FENTANYL SCREEN, URINE 05/05/2024 Neg  Negative <5 ng/mL Final    Drug Screen Comment: 05/05/2024 see below   Final    Rh Factor, External Result 10/25/2023 negative   Final    T. Pallidum (Syphilis) Antibody, E* 01/25/2024 nonreactive   Final    HIV, External Result 10/25/2023 non reactive   Final    GBS, External Result 04/13/2024 negative   Final    ABO, External Result 10/25/2023 B   Final    Hep B, External Result 10/25/2023 negative   Final    Rubella Titer, External Result 10/25/2023 immune   Final    C. Trachomatis, External Result 04/13/2024 negative   Final    N. Gonorrhoeae, External Result 04/13/2024 negative   Final    WBC 05/06/2024 15.4 (H)  4.0 - 11.0 K/uL Final    RBC 05/06/2024 3.07 (L)  4.00 - 5.20 M/uL Final    Hemoglobin 05/06/2024 9.3 (L)  12.0 - 16.0 g/dL Final    Hematocrit 05/06/2024 28.5 (L)  36.0 - 48.0 % Final    MCV 05/06/2024 93.0  80.0 - 100.0 fL Final    MCH 05/06/2024 30.4  26.0 - 34.0 pg Final    MCHC 05/06/2024 32.7  31.0 - 36.0 g/dL Final    RDW 05/06/2024 13.1  12.4 - 15.4 % Final    Platelets 05/06/2024 179  135 - 450 K/uL Final    MPV 05/06/2024 8.6  5.0 - 10.5 fL Final    Sodium 05/06/2024 133 (L)  136 - 145 mmol/L Final    Potassium 05/06/2024 4.0  3.5 - 5.1 mmol/L Final    Chloride 05/06/2024 104  99 - 110 mmol/L Final    CO2 05/06/2024 23  21 - 32 mmol/L Final    Anion Gap 05/06/2024 6  3 - 16 Final    Glucose 05/06/2024 84  70 - 99 mg/dL Final    BUN 05/06/2024 8  7 - 20 mg/dL Final    Creatinine 05/06/2024 0.8  0.6 - 1.1 mg/dL Final    Est, Glom Filt Rate 05/06/2024 >90  >60 Final    Calcium 05/06/2024 7.8 (L)  8.3 - 10.6 mg/dL Final    Sodium 05/07/2024 138  136 - 145 mmol/L Final    Potassium 05/07/2024 3.9  3.5 - 5.1 mmol/L Final    Chloride 05/07/2024 107  99 - 110 mmol/L Final    CO2 05/07/2024 24  21 - 32 mmol/L Final    Anion Gap 05/07/2024 7  3 - 16 Final    Glucose 05/07/2024 94

## 2024-05-08 NOTE — DISCHARGE INSTRUCTIONS
Thank you for the opportunity to care for you and your family.    We hope that you are happy with the care we provided during your stay in the [facility]. We want to ensure that you have the help that you need when you leave the hospital. If there is anything that we can assist you with please let us know.    Breastfeeding mothers may contact our lactation specialists with any problems or questions.  [facility] lactation services phone number is [phone].  Leave a message and your call will be returned.    Please refer to the information provided in the postpartum care booklet.  The following are warning signs to remember.        Call 911 if you have:    Chest pain or pressure  Shortness of breath, even at rest  Thoughts of hurting yourself or others  Seizures    Call your healthcare provider if you have:    Temperature of 100.4 degrees or higher  Stitches that are not healing             --Swelling, bleeding, drainage, foul odor, redness or warmth in/around your                 stitches, staples, or incision (scar)               -- Bad smelling blood or discharge from the vagina  Vaginal bleeding that has increased             --Soaking through one pad in an hour             --You are passing clots larger than the size of a lemon.  Red, warm tender area(s) in your breast or calf.  Headache that does not get better, even after taking medicine; or headache with vision changes    Remember to notify all healthcare providers from your date of delivery up to one year after birth!    CARING FOR YOURSELF      DIET/ACTIVITY    Eat a well balanced diet focusing on food high in fiber and protein.  Drink plenty of fluids, especially water.  To avoid constipation you may take a mild stool softener as recommended by your doctor or midwife.  Gradually increase your activity. Resume an exercise regime only after being advised by your doctor or midwife.  When sitting or lying down, keep your legs elevated to

## 2024-05-08 NOTE — PLAN OF CARE
Problem: Postpartum  Goal: Experiences normal postpartum course  Description:  Postpartum OB-Pregnancy care plan goal which identifies if the mother is experiencing a normal postpartum course  2024 1016 by Lizett Abel RN  Outcome: Completed     Problem: Postpartum  Goal: Appropriate maternal -  bonding  Description:  Postpartum OB-Pregnancy care plan goal which identifies if the mother and  are bonding appropriately  2024 1016 by Lizett Abel RN  Outcome: Completed     Problem: Postpartum  Goal: Establishment of infant feeding pattern  Description:  Postpartum OB-Pregnancy care plan goal which identifies if the mother is establishing a feeding pattern with their   2024 1016 by Lizett Abel RN  Outcome: Completed     Problem: Postpartum  Goal: Incisions, wounds, or drain sites healing without S/S of infection  2024 1016 by Lizett Abel RN  Outcome: Completed  Flowsheets (Taken 2024 0740)  Incisions, Wounds, or Drain Sites Healing Without Sign and Symptoms of Infection: TWICE DAILY: Assess and document skin integrity     Problem: Pain  Goal: Verbalizes/displays adequate comfort level or baseline comfort level  2024 1016 by Lizett Abel RN  Outcome: Completed  Flowsheets (Taken 2024 0740)  Verbalizes/displays adequate comfort level or baseline comfort level:   Encourage patient to monitor pain and request assistance   Assess pain using appropriate pain scale   Administer analgesics based on type and severity of pain and evaluate response   Implement non-pharmacological measures as appropriate and evaluate response     Problem: Infection - Adult  Goal: Absence of infection at discharge  2024 1016 by Lizett Abel RN  Outcome: Completed     Problem: Infection - Adult  Goal: Absence of infection during hospitalization  2024 1016 by Lizett Abel RN  Outcome: Completed     Problem: Infection - Adult  Goal: Absence of

## 2024-05-08 NOTE — DISCHARGE SUMMARY
Obstetrical Discharge Form    Gestational Age:39w6d    Antepartum complications: none    Date of Delivery: 24    Type of Delivery:  for failure to progress    Delivered By:  JT         Baby:   Information for the patient's :  Silas Morejon April [[id number]]      Anesthesia: Epidural    Intrapartum complications: cystotomy with repair    Postpartum complications: none    Condition: good    Discharge Medication:      Medication List        ASK your doctor about these medications      aspirin 81 MG EC tablet     D-3-5 125 MCG (5000 UT) Caps capsule  Generic drug: vitamin D     MULTIVITAMIN PO     PRENATAL VITAMINS PO     Vitamin B 12 500 MCG Tabs     VITAMIN B-6 PO           Motrin, Oxycodone    Discharge Date: 24    Plan:   Follow up in 1 week(s)    CF

## 2024-05-08 NOTE — LACTATION NOTE
This note was copied from a baby's chart.  LACTATION CONSULTATION      Follow-up Consult: Reason for Follow-up: assess needs, assist with obtaining breast pump for home.       Name: Girl Debora Morejon       MRN: [id number]               YOB: 2024   Time of Birth: 3:05 PM   Gestational age: Gestational Age: 39w6d   Birth Weight: Birth Weight: 3.544 kg (7 lb 13 oz) Most Recent Weight: Weight: 3.443 kg (7 lb 9.5 oz)   Weight Change from Birth: -3%            Maternal Assessment:      Maternal Data:   Information for the patient's mother:  Darleen, April [[id number]]   34 y.o.    /Para:   Information for the patient's mother:  Darleen April [[id number]]        Information for the patient's mother:  Darleen, April [[id number]]   39w6d          Breast Assessment  Right Breast: Breasts not assessed this encounter     Left Breast: Breasts not assessed this encounter      Infant Assessment:    DOL:1      Feeding: Breastfeeding      Nipple Shield in Use:No     I&O adequacy:  Urine output: is established  Stool output: is established  Percent weight change from birthweight: -3%     Oral Assessment:   Oral assessment not completed at this time.     Glucose: No     Intervention during consultation:     Interventions Performed:   Education     Latch & Positioning: Encouraged to call for latch assist. Name and number on white board.     Pump Arrangements:  Faxed to: [employer] and Breast pump requested: [employer] Z2    Pump Distributed: Awaiting approval from pump company     Education:  Electric breast pump           Action/Plan:  Breastfeed on cue and at least 8 times/24 hours, unlimited timing. May not nurse this often in the first 24 hours. Wake baby and offer breastfeeding if it has been 3 hours since the beginning of last feeding. Place infant skin to skin if infant will not breastfeed at 3 hours.   Hand express prior to latch to josiane nipple and entice infant to the breast.   It is important to use 
This note was copied from a baby's chart.  Lactation Progress Note      Data:    F/U consult for primip on day 2 po with an infant born at 39.6 weeks gestation. MOB reports infant has been latching and feeding well but her left nipple is very sore. At time of consult MOB had a hydrogel pad on sore nipple.     MOB has a history of infertility, infant conceived via IVF. MOB also had a bladder injury during delivery.          Action:    Introduced self to patient as lactation, name and phone number written on white board in room. Assessed sore nipple and educated mother about soothing and healing and preventing further soreness with a CHATA every time.     Reviewed breastfeeding education with parents; what to expect with infant feedings, infant output, how to know infant is getting enough, the importance of a deep latch and how to achieve it, how to break suction and try again if latch is shallow, normal  behavior, how to wake a sleepy infant to feed, how the breasts work to make milk, protecting milk supply, what to expect with cluster feeding, how to hand express colostrum, and breast care.     Reviewed infant feeding cues and encouraged mother to allow infant to breast feed on demand anytime feeding cues are shown and if no feeding cues are shown to attempt to wake infant to feed every 2-3 hours a minimum of 8-12 feeds a day per 24 hour period. All questions answered. Mother encouraged to call lactation for F/U care as needed.     Response:    MOB verbalized an understanding of education provided and will call for assistance as needed.   
This note was copied from a baby's chart.  Lactation Progress Note      Data:   Follow up assessment of primip breastfeeder attempting to feed infant at this time. Infant is swaddled in crib upon entry to room and showing hunger cues. Mother reports nipple soreness from possible shallow latching for previous feeds. States baby had small amount of dark brown emesis she was worried was a result of bloody breast milk. Primary RN aware.    Action: Introduced self, number on whiteboard. Infant unwrapped and placed skin to skin in football hold to right breast. Mother with great breast support. CHATA achieved with wide open mouth, SRS noted with audible swallows. Mother reports slight discomfort upon initial latching which resolves after a few seconds. Encouraged continued breast support to assist in maintaining deep latch.   Discussed with mother that capillaries in breast could have contributed to blood tinged milk but is not harmful to baby. No bleeding noted from either nipples or expressed milk. Infant fed for 10 minutes before releasing nipple.     Response: Mother pleased with how feeding is going. Encouraged to call for follow up as needed.  
This note was copied from a baby's chart.  Lactation Progress Note      Data:   Initial assessment of primip breast feeder wanting to do skin to skin and possible first feed in the OR. Infant held by FOB upon entering OR.    Action: Introduced self. Infant unwrapped and placed skin to skin with infant close to left breast. Infant showing hunger cues and self attached to nipple. Latch seemed a bit shallow with dimpled cheek and smaller mouth. Baby released latch. RN gave breast support with permission. CHATA achieved with wide open mouth and SRS. Mother reports strong tug, no pinching or pain. Decreased elasticity noted to breast.  Educated on feeding cues and encouraged ad danii feeds with cues. If infant is sleepy or disinterested, encouraged skin to skin and hand expression with offering of colostrum every 2-3 hours. Reviewed ways to know infant is getting enough including weight changes, output and satisfaction cues. Stressed importance of deep comfortable latch and ways to achieve this.   Infant continued to nurse for 25 minutes before sucking stopped and infant released nipple. Remained skin to skin when RN left OR with primary RN caring for infant.    Response: Mother pleased with feeding. Encouraged to call for follow up as needed.  
and diaper log , Skin to skin , Engorgement prevention & management , Electric breast pump , Milk storage guidelines , Reverse pressure softening , Hand expression , [facility] , and [facility]           Action/Plan:  Discharge breastfeeding education reviewed referring to breastfeeding guide booklet including breast care, prevention/treatment of engorgement/mastitis, what to expect with  feeding behaviors, how milk production works, and how to know baby is getting enough from the breast including daily goals for infant feedings, output, and weight trends.  Encouraged to breastfeed on cue and at least 8-12 times/24 hours, unlimited timing. May not nurse this often in the first 24 hours. Wake baby and offer breastfeeding if it has been 3 hours since the beginning of last feeding. Place infant skin to skin if infant will not breastfeed at 3 hours.   Hand express prior to latch to josiane nipple and entice infant to the breast.   It is important to use gentle stimulation during feeding to promote active eating. Offer both breasts at every feeding. Burp infant in between sides. Alternate which breast is used first.   Offer STS often while awake. Mother holding infant skin to skin between feedings will promote milk supply and allow infant to rest more deeply.   Maintain a feeding log until infant is gaining weight and seen by primary care physician.   Reviewed when to begin pumping, storage of EBM, and introduction of EBM via slow flow paced bottles in preparation for return to work. Gave tips for transition back to work to help maintain breastfeeding relationship when home, and how to protect milk supply with pumping while  at work from infant, ensuring to pump at least q3h for 15 minutes. Educated on collection, storage, and preparation guidelines of EBM.  Educated on outpatient lactation support services and how to contact after discharge home. Encouraged 
hand on each side of the breast. Shown how to support the breast and hand express colostrum. Reviewed steps for CHATA. Large drops of colostrum expressed easily and fed to . CHATA achieved with SRS and AS heard. Mother confirmed that the latch felt more comfortable with this feeding. Reassured of CHATA and educated on how a good latch should look and feel. Good feeding observed, baby then detached from the breast, sleepy and without further cues or attempts to latch on. Baby placed STS on mom's chest.     Manual Expression:  Expresses easily, Drops obtained, and MOB states she understands     Amount of milk expressed:   Drops    Pump Arrangements:  Gave [employer] form.    Education:  Latch & positioning , Feeding frequency & feeding cues , Exclusive breastfeeding , Breastfeeding Booklet reviewed , No artificial nipples , Risks of formula feeding , Expected intake and output , Feeding and diaper log , Skin to skin , Hand expression , [facility] , and [facility]     Action/Plan:  Breastfeed on cue and at least 8 times/24 hours, unlimited timing. May not nurse this often in the first 24 hours. Wake baby and offer breastfeeding if it has been 3 hours since the beginning of last feeding. Place infant skin to skin if infant will not breastfeed at 3 hours.   Hand express prior to latch to josiane nipple and entice infant to the breast.   It is important to use gentle stimulation during feeding to promote active eating. Offer both breasts at every feeding. Burp infant in between sides. Alternate which breast is used first.   Offer STS often while awake. Mother holding infant skin to skin between feedings will promote milk supply and allow infant to rest more deeply.   Maintain a feeding log until infant is gaining weight and seen by primary care physician.   Request breastfeeding assistance from LC or RN as needed.     Feeding Plan reviewed with: Parents     Response:

## 2024-05-13 ENCOUNTER — HOSPITAL ENCOUNTER (OUTPATIENT)
Dept: GENERAL RADIOLOGY | Age: 34
Discharge: HOME | End: 2024-05-13
Payer: COMMERCIAL

## 2024-05-13 DIAGNOSIS — S37.23XS: ICD-10-CM

## 2024-05-13 PROCEDURE — 74430 CONTRAST X-RAY BLADDER: CPT

## 2024-05-13 PROCEDURE — 6360000004 HC RX CONTRAST MEDICATION: Performed by: UROLOGY

## 2024-05-13 RX ADMIN — IOPAMIDOL 60 ML: 612 INJECTION, SOLUTION INTRAVENOUS at 11:55

## 2024-05-21 NOTE — ANESTHESIA POSTPROCEDURE EVALUATION
Department of Anesthesiology  Postprocedure Note    Patient: Debora Morejon  MRN: [id number]  YOB: 1990  Date of evaluation: 2024    Procedure Summary       Date: 24 Room / Location: [facility]&[address]    Anesthesia Start: 605 Anesthesia Stop:     Procedures:        SECTION (Abdomen)      Labor Analgesia Diagnosis:       Term pregnancy      (Term pregnancy [Z34.90])    Surgeons: Analia Ji MD Responsible Provider: Leandro Trujillo MD    Anesthesia Type: Epidural ASA Status: 2 - Emergent            Anesthesia Type: Epidural    Amol Phase I: Amol Score: 9    Amol Phase II: Amol Score: 10    Anesthesia Post Evaluation    Level of consciousness: awake  Cardiovascular status: hemodynamically stable  Respiratory status: acceptable  Comments: No apparent complications from neuraxial anesthesia per chart review  Pain management: adequate        No notable events documented.

## (undated) DEVICE — SPONGE LAP W18XL18IN WHT COT 4 PLY FLD STRUNG RADPQ DISP ST 2 PER PACK

## (undated) DEVICE — SUTURE MONOCRYL + SZ 3-0 L27IN ABSRB UD L26MM SH 1/2 CIR MCP416H

## (undated) DEVICE — D&C: Brand: MEDLINE INDUSTRIES, INC.

## (undated) DEVICE — SOLUTION IV IRRIG POUR BRL 0.9% SODIUM CHL 2F7124

## (undated) DEVICE — GLOVE SURG SZ 65 THK91MIL LTX FREE SYN POLYISOPRENE

## (undated) DEVICE — Device

## (undated) DEVICE — GARMENT COMPR L FOR 23IN CALF FLOTRN

## (undated) DEVICE — PAD,NON-ADHERENT,3X8,STERILE,LF,1/PK: Brand: MEDLINE

## (undated) DEVICE — SUTURE VICRYL SZ 0 L36IN ABSRB UD L36MM CT-1 1/2 CIR J946H

## (undated) DEVICE — SUTURE VICRYL SZ 3-0 L36IN ABSRB UD L36MM CT-1 1/2 CIR J944H

## (undated) DEVICE — FETAL PILLOW IS A SINGLE-USE,STERILE FETAL HEAD ELEVATOR CONSISTING OF A MECHANISM THAT ELEVATES THE FETAL HEAD TO FACILITATE DELIVERY DURING A CESAREAN SECTION.FETAL PILLOW IS INTENDED TO ELEVATE THE FETAL HEAD AND FACILITATE DELIVERY OF THE FETUS IN WOMEN REQUIRING A CESAREAN SECTION AT FULL DILATION OR THOSE REQUIRING A CESAREAN SECTION AFTER A FAILED INSTRUMENTAL VAGINAL DELIVERY.: Brand: SOS

## (undated) DEVICE — S/USE RESUS KIT W/O MASK (10): Brand: FISHER & PAYKEL HEALTHCARE

## (undated) DEVICE — SET FLD MGMT CTRL SYS INFLO TB AQUILEX

## (undated) DEVICE — GUIDEWIRE UROLOGICAL STR STD 0.035 IN X150 CM (QTY = BOX OF 5)

## (undated) DEVICE — DRESSING COMP IS W4XL10IN PD W2XL8IN CNTCT LAYR ADH

## (undated) DEVICE — SET FLD MGMT OUTFLO TB DISP FOR CTRL SYS AQUILEX

## (undated) DEVICE — SUTURE MONOCRYL + ABSORBABLE MONOFILAMENT 2-0 SH 27 IN VIO  MCP317H

## (undated) DEVICE — SET ENDOSCP SEAL HYSTEROSCOPE RIG OUTFLO CHN DISP MYOSURE

## (undated) DEVICE — DEVICE TISS REM DIA3MM L25.25IN ENDOSCP F/ IU POLYPS

## (undated) DEVICE — SUTURE VICRYL COAT SZ 4-0 L18IN ABSRB UD L19MM PS-2 1/2 CIR J496G

## (undated) DEVICE — SUTURE MONOCRYL SZ 0 L36IN ABSRB VLT CT L40MM 1/2 CIR TAPR PNT Y358H

## (undated) DEVICE — BLADE CLIPPER GEN PURP NS

## (undated) DEVICE — TRAY URIN CATH 16FR DRNGE BG STATLOK STBL DEV F SURSTP